# Patient Record
Sex: MALE | Race: WHITE | NOT HISPANIC OR LATINO | URBAN - METROPOLITAN AREA
[De-identification: names, ages, dates, MRNs, and addresses within clinical notes are randomized per-mention and may not be internally consistent; named-entity substitution may affect disease eponyms.]

---

## 2017-08-08 ENCOUNTER — INPATIENT (INPATIENT)
Facility: HOSPITAL | Age: 52
LOS: 1 days | Discharge: HOME | DRG: 176 | End: 2017-08-10
Attending: STUDENT IN AN ORGANIZED HEALTH CARE EDUCATION/TRAINING PROGRAM | Admitting: INTERNAL MEDICINE
Payer: COMMERCIAL

## 2017-08-08 VITALS
SYSTOLIC BLOOD PRESSURE: 125 MMHG | HEIGHT: 71 IN | WEIGHT: 179.9 LBS | RESPIRATION RATE: 16 BRPM | DIASTOLIC BLOOD PRESSURE: 85 MMHG | OXYGEN SATURATION: 100 % | TEMPERATURE: 98 F | HEART RATE: 52 BPM

## 2017-08-08 DIAGNOSIS — I26.99 OTHER PULMONARY EMBOLISM WITHOUT ACUTE COR PULMONALE: ICD-10-CM

## 2017-08-08 DIAGNOSIS — Z98.890 OTHER SPECIFIED POSTPROCEDURAL STATES: Chronic | ICD-10-CM

## 2017-08-08 DIAGNOSIS — Z90.49 ACQUIRED ABSENCE OF OTHER SPECIFIED PARTS OF DIGESTIVE TRACT: Chronic | ICD-10-CM

## 2017-08-08 DIAGNOSIS — I20.0 UNSTABLE ANGINA: ICD-10-CM

## 2017-08-08 LAB
ALBUMIN SERPL ELPH-MCNC: 3.8 G/DL — SIGNIFICANT CHANGE UP (ref 3.3–5)
ALBUMIN SERPL ELPH-MCNC: 4.4 G/DL — SIGNIFICANT CHANGE UP (ref 3.3–5)
ALP SERPL-CCNC: 74 U/L — SIGNIFICANT CHANGE UP (ref 40–120)
ALP SERPL-CCNC: 81 U/L — SIGNIFICANT CHANGE UP (ref 40–120)
ALT FLD-CCNC: 12 U/L RC — SIGNIFICANT CHANGE UP (ref 10–45)
ALT FLD-CCNC: 15 U/L RC — SIGNIFICANT CHANGE UP (ref 10–45)
ANION GAP SERPL CALC-SCNC: 11 MMOL/L — SIGNIFICANT CHANGE UP (ref 5–17)
ANION GAP SERPL CALC-SCNC: 14 MMOL/L — SIGNIFICANT CHANGE UP (ref 5–17)
APTT BLD: 35.2 SEC — SIGNIFICANT CHANGE UP (ref 27.5–37.4)
APTT BLD: 35.8 SEC — SIGNIFICANT CHANGE UP (ref 27.5–37.4)
AST SERPL-CCNC: 17 U/L — SIGNIFICANT CHANGE UP (ref 10–40)
AST SERPL-CCNC: 21 U/L — SIGNIFICANT CHANGE UP (ref 10–40)
BASOPHILS # BLD AUTO: 0 K/UL — SIGNIFICANT CHANGE UP (ref 0–0.2)
BASOPHILS NFR BLD AUTO: 0.3 % — SIGNIFICANT CHANGE UP (ref 0–2)
BILIRUB SERPL-MCNC: 0.4 MG/DL — SIGNIFICANT CHANGE UP (ref 0.2–1.2)
BILIRUB SERPL-MCNC: 0.6 MG/DL — SIGNIFICANT CHANGE UP (ref 0.2–1.2)
BLD GP AB SCN SERPL QL: NEGATIVE — SIGNIFICANT CHANGE UP
BUN SERPL-MCNC: 14 MG/DL — SIGNIFICANT CHANGE UP (ref 7–23)
BUN SERPL-MCNC: 15 MG/DL — SIGNIFICANT CHANGE UP (ref 7–23)
CALCIUM SERPL-MCNC: 9.1 MG/DL — SIGNIFICANT CHANGE UP (ref 8.4–10.5)
CALCIUM SERPL-MCNC: 9.5 MG/DL — SIGNIFICANT CHANGE UP (ref 8.4–10.5)
CHLORIDE SERPL-SCNC: 102 MMOL/L — SIGNIFICANT CHANGE UP (ref 96–108)
CHLORIDE SERPL-SCNC: 104 MMOL/L — SIGNIFICANT CHANGE UP (ref 96–108)
CK MB BLD-MCNC: 1.3 % — SIGNIFICANT CHANGE UP (ref 0–3.5)
CK MB CFR SERPL CALC: 1.2 NG/ML — SIGNIFICANT CHANGE UP (ref 0–6.7)
CK SERPL-CCNC: 90 U/L — SIGNIFICANT CHANGE UP (ref 30–200)
CO2 SERPL-SCNC: 25 MMOL/L — SIGNIFICANT CHANGE UP (ref 22–31)
CO2 SERPL-SCNC: 29 MMOL/L — SIGNIFICANT CHANGE UP (ref 22–31)
CREAT SERPL-MCNC: 0.86 MG/DL — SIGNIFICANT CHANGE UP (ref 0.5–1.3)
CREAT SERPL-MCNC: 0.91 MG/DL — SIGNIFICANT CHANGE UP (ref 0.5–1.3)
D DIMER BLD IA.RAPID-MCNC: 1189 NG/ML DDU — HIGH
EOSINOPHIL # BLD AUTO: 0.5 K/UL — SIGNIFICANT CHANGE UP (ref 0–0.5)
EOSINOPHIL NFR BLD AUTO: 5.6 % — SIGNIFICANT CHANGE UP (ref 0–6)
GLUCOSE SERPL-MCNC: 110 MG/DL — HIGH (ref 70–99)
GLUCOSE SERPL-MCNC: 89 MG/DL — SIGNIFICANT CHANGE UP (ref 70–99)
HCT VFR BLD CALC: 41.9 % — SIGNIFICANT CHANGE UP (ref 39–50)
HCT VFR BLD CALC: 46.3 % — SIGNIFICANT CHANGE UP (ref 39–50)
HGB BLD-MCNC: 14.2 G/DL — SIGNIFICANT CHANGE UP (ref 13–17)
HGB BLD-MCNC: 15.1 G/DL — SIGNIFICANT CHANGE UP (ref 13–17)
INR BLD: 1.14 RATIO — SIGNIFICANT CHANGE UP (ref 0.88–1.16)
INR BLD: 1.15 RATIO — SIGNIFICANT CHANGE UP (ref 0.88–1.16)
LYMPHOCYTES # BLD AUTO: 2 K/UL — SIGNIFICANT CHANGE UP (ref 1–3.3)
LYMPHOCYTES # BLD AUTO: 23.6 % — SIGNIFICANT CHANGE UP (ref 13–44)
MAGNESIUM SERPL-MCNC: 1.9 MG/DL — SIGNIFICANT CHANGE UP (ref 1.6–2.6)
MCHC RBC-ENTMCNC: 29.4 PG — SIGNIFICANT CHANGE UP (ref 27–34)
MCHC RBC-ENTMCNC: 30.2 PG — SIGNIFICANT CHANGE UP (ref 27–34)
MCHC RBC-ENTMCNC: 32.6 GM/DL — SIGNIFICANT CHANGE UP (ref 32–36)
MCHC RBC-ENTMCNC: 33.9 GM/DL — SIGNIFICANT CHANGE UP (ref 32–36)
MCV RBC AUTO: 89.3 FL — SIGNIFICANT CHANGE UP (ref 80–100)
MCV RBC AUTO: 90.2 FL — SIGNIFICANT CHANGE UP (ref 80–100)
MONOCYTES # BLD AUTO: 0.6 K/UL — SIGNIFICANT CHANGE UP (ref 0–0.9)
MONOCYTES NFR BLD AUTO: 6.8 % — SIGNIFICANT CHANGE UP (ref 2–14)
NEUTROPHILS # BLD AUTO: 5.5 K/UL — SIGNIFICANT CHANGE UP (ref 1.8–7.4)
NEUTROPHILS NFR BLD AUTO: 63.8 % — SIGNIFICANT CHANGE UP (ref 43–77)
PHOSPHATE SERPL-MCNC: 3 MG/DL — SIGNIFICANT CHANGE UP (ref 2.5–4.5)
PLATELET # BLD AUTO: 185 K/UL — SIGNIFICANT CHANGE UP (ref 150–400)
PLATELET # BLD AUTO: 226 K/UL — SIGNIFICANT CHANGE UP (ref 150–400)
POTASSIUM SERPL-MCNC: 3.8 MMOL/L — SIGNIFICANT CHANGE UP (ref 3.5–5.3)
POTASSIUM SERPL-MCNC: 4.6 MMOL/L — SIGNIFICANT CHANGE UP (ref 3.5–5.3)
POTASSIUM SERPL-SCNC: 3.8 MMOL/L — SIGNIFICANT CHANGE UP (ref 3.5–5.3)
POTASSIUM SERPL-SCNC: 4.6 MMOL/L — SIGNIFICANT CHANGE UP (ref 3.5–5.3)
PROT SERPL-MCNC: 6.1 G/DL — SIGNIFICANT CHANGE UP (ref 6–8.3)
PROT SERPL-MCNC: 7 G/DL — SIGNIFICANT CHANGE UP (ref 6–8.3)
PROTHROM AB SERPL-ACNC: 12.5 SEC — SIGNIFICANT CHANGE UP (ref 9.8–12.7)
PROTHROM AB SERPL-ACNC: 12.6 SEC — SIGNIFICANT CHANGE UP (ref 9.8–12.7)
RBC # BLD: 4.69 M/UL — SIGNIFICANT CHANGE UP (ref 4.2–5.8)
RBC # BLD: 5.13 M/UL — SIGNIFICANT CHANGE UP (ref 4.2–5.8)
RBC # FLD: 11.9 % — SIGNIFICANT CHANGE UP (ref 10.3–14.5)
RBC # FLD: 12.2 % — SIGNIFICANT CHANGE UP (ref 10.3–14.5)
RH IG SCN BLD-IMP: POSITIVE — SIGNIFICANT CHANGE UP
SODIUM SERPL-SCNC: 141 MMOL/L — SIGNIFICANT CHANGE UP (ref 135–145)
SODIUM SERPL-SCNC: 144 MMOL/L — SIGNIFICANT CHANGE UP (ref 135–145)
TROPONIN T SERPL-MCNC: <0.01 NG/ML — SIGNIFICANT CHANGE UP (ref 0–0.06)
WBC # BLD: 8 K/UL — SIGNIFICANT CHANGE UP (ref 3.8–10.5)
WBC # BLD: 8.7 K/UL — SIGNIFICANT CHANGE UP (ref 3.8–10.5)
WBC # FLD AUTO: 8 K/UL — SIGNIFICANT CHANGE UP (ref 3.8–10.5)
WBC # FLD AUTO: 8.7 K/UL — SIGNIFICANT CHANGE UP (ref 3.8–10.5)

## 2017-08-08 PROCEDURE — 71275 CT ANGIOGRAPHY CHEST: CPT | Mod: 26

## 2017-08-08 PROCEDURE — 93010 ELECTROCARDIOGRAM REPORT: CPT

## 2017-08-08 PROCEDURE — 93970 EXTREMITY STUDY: CPT | Mod: 26

## 2017-08-08 RX ORDER — HEPARIN SODIUM 5000 [USP'U]/ML
3000 INJECTION INTRAVENOUS; SUBCUTANEOUS EVERY 6 HOURS
Qty: 0 | Refills: 0 | Status: DISCONTINUED | OUTPATIENT
Start: 2017-08-08 | End: 2017-08-10

## 2017-08-08 RX ORDER — HEPARIN SODIUM 5000 [USP'U]/ML
6500 INJECTION INTRAVENOUS; SUBCUTANEOUS ONCE
Qty: 0 | Refills: 0 | Status: DISCONTINUED | OUTPATIENT
Start: 2017-08-08 | End: 2017-08-08

## 2017-08-08 RX ORDER — HEPARIN SODIUM 5000 [USP'U]/ML
6500 INJECTION INTRAVENOUS; SUBCUTANEOUS EVERY 6 HOURS
Qty: 0 | Refills: 0 | Status: DISCONTINUED | OUTPATIENT
Start: 2017-08-08 | End: 2017-08-10

## 2017-08-08 RX ORDER — POTASSIUM CHLORIDE 20 MEQ
40 PACKET (EA) ORAL ONCE
Qty: 0 | Refills: 0 | Status: DISCONTINUED | OUTPATIENT
Start: 2017-08-08 | End: 2017-08-09

## 2017-08-08 RX ORDER — MAGNESIUM SULFATE 500 MG/ML
1 VIAL (ML) INJECTION ONCE
Qty: 0 | Refills: 0 | Status: DISCONTINUED | OUTPATIENT
Start: 2017-08-08 | End: 2017-08-09

## 2017-08-08 RX ORDER — ACETAMINOPHEN 500 MG
650 TABLET ORAL ONCE
Qty: 0 | Refills: 0 | Status: DISCONTINUED | OUTPATIENT
Start: 2017-08-08 | End: 2017-08-08

## 2017-08-08 RX ORDER — HEPARIN SODIUM 5000 [USP'U]/ML
INJECTION INTRAVENOUS; SUBCUTANEOUS
Qty: 25000 | Refills: 0 | Status: DISCONTINUED | OUTPATIENT
Start: 2017-08-08 | End: 2017-08-10

## 2017-08-08 RX ORDER — METOPROLOL TARTRATE 50 MG
25 TABLET ORAL DAILY
Qty: 0 | Refills: 0 | Status: DISCONTINUED | OUTPATIENT
Start: 2017-08-08 | End: 2017-08-08

## 2017-08-08 RX ADMIN — HEPARIN SODIUM 1500 UNIT(S)/HR: 5000 INJECTION INTRAVENOUS; SUBCUTANEOUS at 21:56

## 2017-08-08 NOTE — H&P CARDIOLOGY - HISTORY OF PRESENT ILLNESS
51 y/o male PMH cardiac arrhythmia (?Afib) s/p ablation (9/2016) presents c/o exertional chest pressure and dyspnea with walking or climbing <1 flight of stairs over the past 1-2 weeks. Symptoms improve with rest. Pt reports he normally runs and plays sports regularly. Evaluated by PCP, Dr. Wakefield, and referred for cardiac catheterization today. Pt started Toprol yesterday. 53 y/o male PMH cardiac arrhythmia (?Afib) s/p ablation (9/2016) presents c/o exertional chest pressure and dyspnea with walking or climbing <1 flight of stairs over the past 1-2 weeks. Symptoms improve with rest. Pt reports he normally runs and plays sports regularly. Evaluated by PCP, Dr. Wakefield, and referred for cardiac catheterization today. Pt states he had echo yesterday (no results in chart).   Pt started Toprol yesterday. 51 y/o male PMH cardiac arrhythmia (?Afib) s/p ablation (9/2016) presents c/o exertional chest pressure and dyspnea with walking or climbing <1 flight of stairs over the past 1-2 weeks. Symptoms improve with rest. Pt reports he normally runs and plays sports regularly. Evaluated by PCP, Dr. Wakefield, and referred for cardiac catheterization today. Pt states he had echo yesterday (no results in chart).   Pt started Toprol yesterday.   He reports recent travel to [place].

## 2017-08-08 NOTE — CHART NOTE - NSCHARTNOTEFT_GEN_A_CORE
TRANSFER from cath recovery     ACCEPTING PHSYICIAN: Dr. Amaya    CHIEF COMPLAINT: ROSAS and chest pressure on exertion     SUBJECTIVE: @ rest no CP/sob/palpitations. No bruising/bleeding. No melena/hematemesis/hematochezia/coffee ground emesis. No s/s bleeding. No lightheadedness/dizziness/syncope/near syncope. No hx malignancy, no family hx blood clots, no trauma or injury. Recent 7+ hour flight to and from [facility] (back 7/8). Notes he did have L foot pain and swelling while in [place] x 1 day. Has chronic OA joint pain.     All other ROS negative except as stated above     OBJECTIVE:  Vital Signs Last 24 Hrs  T(C): 36.7 (08 Aug 2017 21:30), Max: 36.7 (08 Aug 2017 21:30)  T(F): 98 (08 Aug 2017 21:30), Max: 98 (08 Aug 2017 21:30)  HR: 54 (08 Aug 2017 21:30) (52 - 54)  BP: 127/81 (08 Aug 2017 21:30) (125/85 - 127/81)  BP(mean): 96 (08 Aug 2017 21:30) (96 - 98)  RR: 17 (08 Aug 2017 21:30) (16 - 17)  SpO2: 99% (08 Aug 2017 21:30) (99% - 100%)    PHYSICAL EXAM:   General: NAD, well nourished  HEENT: oral mucosa moist. head atraumatic, normocephalic.   Cardiovascular: S1, S2. bradycardic. no JVD. no LE edema. No calf tenderness/swelling - calves equal b/l. Pulses 2+ UE, LE bilaterally   Respiratory: CTA bilaterally, good rate and effort   Gastrointestinal: soft, non tender, non distended, normoactive BS all 4 quadrants   Genitourinary: no CV angle tenderness   Integumentary: RFA access site w/ out bruising, bleeding or hematoma note. Groin soft. No focal lesions or breakdown   Musculoskeletal: moving all 4 extremities - strength symmetric bilaterally   Hematologic/Lymphatic: ? enlarged R inguinal lymph node   Neurologic: A, A, O x 4. PERRL  Psychologic: mood and affect appropriate.     TELEMETRY:sinus elizabeth     EKG:     CARDIAC CATH: diagnostic LHC, RHC     ECHO:     < from: [facility] Duplex Lower Ext Vein Scan, Bilat (08.08.17 @ 19:43) >    Above and below-the-knee deep venous thrombosis in the left lower   extremity.      CTA chest prelim - extensive bilateral pulmonary embolii.  bowing of the interventricular septum may be concercing for right heart strain, which may be confirmed with echocardiogram.                        15.1   8.0   )-----------( 226      ( 08 Aug 2017 12:11 )             46.3     08-08    144  |  104  |  14  ----------------------------<  89  4.6   |  29  |  0.91    Ca    9.5      08 Aug 2017 12:11    TPro  7.0  /  Alb  4.4  /  TBili  0.6  /  DBili  x   /  AST  21  /  ALT  15  /  AlkPhos  81  08-08    LIVER FUNCTIONS - ( 08 Aug 2017 12:11 )  Alb: 4.4 g/dL / Pro: 7.0 g/dL / ALK PHOS: 81 U/L / ALT: 15 U/L RC / AST: 21 U/L / GGT: x           PT/INR - ( 08 Aug 2017 20:37 )   PT: 12.5 sec;   INR: 1.14 ratio      PTT - ( 08 Aug 2017 20:37 )  PTT:35.2 sec    ASSESSMENT/PLAN:     53 yo active M A fib (ablation 9/2016 Dr Perez), OA, s/p flight to and from [place] 1 mo ago p/w 1-2 weeks of progressively worsening and activity limiting ROSAS and exertional chest pressure. Admitted for LHC/RHC 8/8 w/ clean coronaries. Found to have above and below the knee LLE DVT and b/l PEs w/ evidence on CTA for RH strain    1. LLE DVT and b/l PEs - likely provoked in setting of transatlantic flight - hemodynamically stable @ this time, no s/s active bleeding, start heparin gtt. obtain TTE and CE to eval for RH strain. Monitor in ICU for s/s acute decompensation. Will need to ambulate patient to assess HR and oxygenation      2. pA fib- currently in NSR, not on AC @ this time s/p a fib ablation 9/2016 (previously on eliquis). Hold toprol for now (first dose taken yesterday)     Susannah Beck CCU NP   #[id number] TRANSFER from cath recovery     ACCEPTING PHSYICIAN: Dr. Amaya    CHIEF COMPLAINT: ROSAS and chest pressure on exertion     SUBJECTIVE: @ rest no CP/sob/palpitations. No bruising/bleeding. No melena/hematemesis/hematochezia/coffee ground emesis. No s/s bleeding. No lightheadedness/dizziness/syncope/near syncope. No hx malignancy, no family hx blood clots, no trauma or injury. Recent 7+ hour flight to and from [facility] (back 7/8). Notes he did have L foot pain and swelling while in [place] x 1 day. Has chronic OA joint pain.     All other ROS negative except as stated above     OBJECTIVE:  Vital Signs Last 24 Hrs  T(C): 36.7 (08 Aug 2017 21:30), Max: 36.7 (08 Aug 2017 21:30)  T(F): 98 (08 Aug 2017 21:30), Max: 98 (08 Aug 2017 21:30)  HR: 54 (08 Aug 2017 21:30) (52 - 54)  BP: 127/81 (08 Aug 2017 21:30) (125/85 - 127/81)  BP(mean): 96 (08 Aug 2017 21:30) (96 - 98)  RR: 17 (08 Aug 2017 21:30) (16 - 17)  SpO2: 99% (08 Aug 2017 21:30) (99% - 100%)    PHYSICAL EXAM:   General: NAD, well nourished  HEENT: oral mucosa moist. head atraumatic, normocephalic.   Cardiovascular: S1, S2. bradycardic. no JVD. no LE edema. No calf tenderness/swelling - calves equal b/l. Pulses 2+ UE, LE bilaterally   Respiratory: CTA bilaterally, good rate and effort   Gastrointestinal: soft, non tender, non distended, normoactive BS all 4 quadrants   Genitourinary: no CV angle tenderness   Integumentary: RFA access site w/ out bruising, bleeding or hematoma note. Groin soft. No focal lesions or breakdown   Musculoskeletal: moving all 4 extremities - strength symmetric bilaterally   Hematologic/Lymphatic: ? enlarged R inguinal lymph node   Neurologic: A, A, O x 4. PERRL  Psychologic: mood and affect appropriate.     TELEMETRY:sinus elizabeth     EKG:     CARDIAC CATH:   < from: Cardiac Cath Lab - Adult (08.08.17 @ 13:22) >  EF 55%   The coronary circulation is right dominant.  LM:   --  LM: Normal.  LAD:   --  LAD: Normal.  CX:   --  Circumflex: Normal.  RCA:   --  RCA: Normal.    < from: Cardiac Cath Lab - Adult (08.08.17 @ 13:22) >  Pressures:  -- Pulmonary Artery (S/D/M): 34/9/20  Pressures:  -- Pulmonary Capillary Wedge: 15/21/14  Pressures:  -- Right Atrium (a/v/M): 12/8/6  Outputs:  -- OUTPUTS: CO by Roz: 7.08  Outputs:  -- OUTPUTS: Roz cardiac index: 3.51    ECHO:     < from: [facility] Duplex Lower Ext Vein Scan, Bilat (08.08.17 @ 19:43) >    Above and below-the-knee deep venous thrombosis in the left lower   extremity.      CTA chest prelim - extensive bilateral pulmonary embolii.  bowing of the interventricular septum may be concercing for right heart strain, which may be confirmed with echocardiogram.                        15.1   8.0   )-----------( 226      ( 08 Aug 2017 12:11 )             46.3     08-08    144  |  104  |  14  ----------------------------<  89  4.6   |  29  |  0.91    Ca    9.5      08 Aug 2017 12:11    TPro  7.0  /  Alb  4.4  /  TBili  0.6  /  DBili  x   /  AST  21  /  ALT  15  /  AlkPhos  81  08-08    LIVER FUNCTIONS - ( 08 Aug 2017 12:11 )  Alb: 4.4 g/dL / Pro: 7.0 g/dL / ALK PHOS: 81 U/L / ALT: 15 U/L RC / AST: 21 U/L / GGT: x           PT/INR - ( 08 Aug 2017 20:37 )   PT: 12.5 sec;   INR: 1.14 ratio      PTT - ( 08 Aug 2017 20:37 )  PTT:35.2 sec    ASSESSMENT/PLAN:     51 yo active M CHRYSTAL delvalle (ablation 9/2016 Dr Perez), OA, s/p flight to and from [place] 1 mo ago p/w 1-2 weeks of progressively worsening and activity limiting ROSAS and exertional chest pressure. Admitted for LHC/RHC 8/8 w/ clean coronaries. Found to have above and below the knee LLE DVT and b/l PEs w/ evidence on CTA for RH strain    1. LLE DVT and b/l PEs - likely provoked in setting of transatlantic flight - hemodynamically stable @ this time, no s/s active bleeding, start heparin gtt. obtain TTE and CE to eval for RH strain. Monitor in ICU for s/s acute decompensation. Will need to ambulate patient to assess HR and oxygenation      2. pA fib- currently in NSR, not on AC @ this time s/p a fib ablation 9/2016 (previously on eliquis). Hold toprol for now (first dose taken yesterday)     Susannah Beck CCU NP   #[id number] TRANSFER from cath recovery     ACCEPTING PHSYICIAN: Dr. Amaya    CHIEF COMPLAINT: ROSAS and chest pressure on exertion     SUBJECTIVE: @ rest no CP/sob/palpitations. No bruising/bleeding. No melena/hematemesis/hematochezia/coffee ground emesis. No s/s bleeding. No lightheadedness/dizziness/syncope/near syncope. No hx malignancy, no family hx blood clots, no trauma or injury. Recent 7+ hour flight to and from [facility] (back 7/8). Notes he did have L foot pain and swelling while in [place] x 1 day. Has chronic OA joint pain.     All other ROS negative except as stated above     OBJECTIVE:  Vital Signs Last 24 Hrs  T(C): 36.7 (08 Aug 2017 21:30), Max: 36.7 (08 Aug 2017 21:30)  T(F): 98 (08 Aug 2017 21:30), Max: 98 (08 Aug 2017 21:30)  HR: 54 (08 Aug 2017 21:30) (52 - 54)  BP: 127/81 (08 Aug 2017 21:30) (125/85 - 127/81)  BP(mean): 96 (08 Aug 2017 21:30) (96 - 98)  RR: 17 (08 Aug 2017 21:30) (16 - 17)  SpO2: 99% (08 Aug 2017 21:30) (99% - 100%)    PHYSICAL EXAM:   General: NAD, well nourished  HEENT: oral mucosa moist. head atraumatic, normocephalic.   Cardiovascular: S1, S2. bradycardic. no JVD. no LE edema. No calf tenderness/swelling - calves equal b/l. Pulses 2+ UE, LE bilaterally   Respiratory: CTA bilaterally, good rate and effort   Gastrointestinal: soft, non tender, non distended, normoactive BS all 4 quadrants   Genitourinary: no CV angle tenderness   Integumentary: RFA access site w/ out bruising, bleeding or hematoma note. Groin soft. No focal lesions or breakdown   Musculoskeletal: moving all 4 extremities - strength symmetric bilaterally   Hematologic/Lymphatic: ? enlarged R inguinal lymph node   Neurologic: A, A, O x 4. PERRL  Psychologic: mood and affect appropriate.     TELEMETRY:sinus elizabeth     EKG:     CARDIAC CATH:   < from: Cardiac Cath Lab - Adult (08.08.17 @ 13:22) >  EF 55%   The coronary circulation is right dominant.  LM:   --  LM: Normal.  LAD:   --  LAD: Normal.  CX:   --  Circumflex: Normal.  RCA:   --  RCA: Normal.    < from: Cardiac Cath Lab - Adult (08.08.17 @ 13:22) >  Pressures:  -- Pulmonary Artery (S/D/M): 34/9/20  Pressures:  -- Pulmonary Capillary Wedge: 15/21/14  Pressures:  -- Right Atrium (a/v/M): 12/8/6  Outputs:  -- OUTPUTS: CO by Roz: 7.08  Outputs:  -- OUTPUTS: Roz cardiac index: 3.51    ECHO:     < from: [facility] Duplex Lower Ext Vein Scan, Bilat (08.08.17 @ 19:43) >    Above and below-the-knee deep venous thrombosis in the left lower   extremity.      CTA chest prelim - extensive bilateral pulmonary embolii.  bowing of the interventricular septum may be concercing for right heart strain, which may be confirmed with echocardiogram.                        15.1   8.0   )-----------( 226      ( 08 Aug 2017 12:11 )             46.3     08-08    144  |  104  |  14  ----------------------------<  89  4.6   |  29  |  0.91    Ca    9.5      08 Aug 2017 12:11    TPro  7.0  /  Alb  4.4  /  TBili  0.6  /  DBili  x   /  AST  21  /  ALT  15  /  AlkPhos  81  08-08    LIVER FUNCTIONS - ( 08 Aug 2017 12:11 )  Alb: 4.4 g/dL / Pro: 7.0 g/dL / ALK PHOS: 81 U/L / ALT: 15 U/L RC / AST: 21 U/L / GGT: x           PT/INR - ( 08 Aug 2017 20:37 )   PT: 12.5 sec;   INR: 1.14 ratio      PTT - ( 08 Aug 2017 20:37 )  PTT:35.2 sec    ASSESSMENT/PLAN:     53 yo active M CHRYSTAL delvalle (ablation 9/2016 Dr Perez), OA, s/p flight to and from [place] 1 mo ago p/w 1-2 weeks of progressively worsening and activity limiting ROSAS and exertional chest pressure. Admitted for LHC/RHC 8/8 w/ clean coronaries. Found to have above and below the knee LLE DVT and b/l PEs w/ evidence on CTA for RH strain.     1. LLE DVT and b/l PEs - likely provoked in setting of transatlantic flight - hemodynamically stable @ this time, no s/s active bleeding, start heparin gtt. obtain TTE and CE to eval for RH strain. Monitor in ICU for s/s acute decompensation. Will need to ambulate patient to assess HR and oxygenation. Monitor Cr s/p cath and CT coronaries     2. pA fib- currently in NSR, not on AC @ this time s/p a fib ablation 9/2016 (previously on eliquis). Hold toprol for now (first dose taken yesterday)     Susannah Beck CCU NP   #[id number]

## 2017-08-09 ENCOUNTER — TRANSCRIPTION ENCOUNTER (OUTPATIENT)
Age: 52
End: 2017-08-09

## 2017-08-09 DIAGNOSIS — I26.99 OTHER PULMONARY EMBOLISM WITHOUT ACUTE COR PULMONALE: ICD-10-CM

## 2017-08-09 LAB
ALBUMIN SERPL ELPH-MCNC: 3.9 G/DL — SIGNIFICANT CHANGE UP (ref 3.3–5)
ALBUMIN SERPL ELPH-MCNC: 4.1 G/DL — SIGNIFICANT CHANGE UP (ref 3.3–5)
ALBUMIN SERPL ELPH-MCNC: 4.1 G/DL — SIGNIFICANT CHANGE UP (ref 3.3–5)
ALP SERPL-CCNC: 78 U/L — SIGNIFICANT CHANGE UP (ref 40–120)
ALP SERPL-CCNC: 79 U/L — SIGNIFICANT CHANGE UP (ref 40–120)
ALP SERPL-CCNC: 84 U/L — SIGNIFICANT CHANGE UP (ref 40–120)
ALT FLD-CCNC: 13 U/L RC — SIGNIFICANT CHANGE UP (ref 10–45)
ALT FLD-CCNC: 14 U/L RC — SIGNIFICANT CHANGE UP (ref 10–45)
ALT FLD-CCNC: 14 U/L RC — SIGNIFICANT CHANGE UP (ref 10–45)
ANION GAP SERPL CALC-SCNC: 12 MMOL/L — SIGNIFICANT CHANGE UP (ref 5–17)
ANION GAP SERPL CALC-SCNC: 13 MMOL/L — SIGNIFICANT CHANGE UP (ref 5–17)
ANION GAP SERPL CALC-SCNC: 15 MMOL/L — SIGNIFICANT CHANGE UP (ref 5–17)
APTT BLD: 116.3 SEC — HIGH (ref 27.5–37.4)
APTT BLD: 89.8 SEC — HIGH (ref 27.5–37.4)
APTT BLD: 92 SEC — HIGH (ref 27.5–37.4)
AST SERPL-CCNC: 17 U/L — SIGNIFICANT CHANGE UP (ref 10–40)
AST SERPL-CCNC: 18 U/L — SIGNIFICANT CHANGE UP (ref 10–40)
AST SERPL-CCNC: 26 U/L — SIGNIFICANT CHANGE UP (ref 10–40)
BASOPHILS # BLD AUTO: 0 K/UL — SIGNIFICANT CHANGE UP (ref 0–0.2)
BASOPHILS # BLD AUTO: 0.1 K/UL — SIGNIFICANT CHANGE UP (ref 0–0.2)
BASOPHILS NFR BLD AUTO: 0.4 % — SIGNIFICANT CHANGE UP (ref 0–2)
BASOPHILS NFR BLD AUTO: 0.7 % — SIGNIFICANT CHANGE UP (ref 0–2)
BILIRUB SERPL-MCNC: 0.3 MG/DL — SIGNIFICANT CHANGE UP (ref 0.2–1.2)
BILIRUB SERPL-MCNC: 0.4 MG/DL — SIGNIFICANT CHANGE UP (ref 0.2–1.2)
BILIRUB SERPL-MCNC: 0.5 MG/DL — SIGNIFICANT CHANGE UP (ref 0.2–1.2)
BUN SERPL-MCNC: 11 MG/DL — SIGNIFICANT CHANGE UP (ref 7–23)
BUN SERPL-MCNC: 13 MG/DL — SIGNIFICANT CHANGE UP (ref 7–23)
BUN SERPL-MCNC: 13 MG/DL — SIGNIFICANT CHANGE UP (ref 7–23)
CALCIUM SERPL-MCNC: 9.1 MG/DL — SIGNIFICANT CHANGE UP (ref 8.4–10.5)
CALCIUM SERPL-MCNC: 9.3 MG/DL — SIGNIFICANT CHANGE UP (ref 8.4–10.5)
CALCIUM SERPL-MCNC: 9.5 MG/DL — SIGNIFICANT CHANGE UP (ref 8.4–10.5)
CHLORIDE SERPL-SCNC: 100 MMOL/L — SIGNIFICANT CHANGE UP (ref 96–108)
CHLORIDE SERPL-SCNC: 101 MMOL/L — SIGNIFICANT CHANGE UP (ref 96–108)
CHLORIDE SERPL-SCNC: 102 MMOL/L — SIGNIFICANT CHANGE UP (ref 96–108)
CHOLEST SERPL-MCNC: 176 MG/DL — SIGNIFICANT CHANGE UP (ref 10–199)
CK MB BLD-MCNC: 1.3 % — SIGNIFICANT CHANGE UP (ref 0–3.5)
CK MB BLD-MCNC: 1.3 % — SIGNIFICANT CHANGE UP (ref 0–3.5)
CK MB BLD-MCNC: 1.5 % — SIGNIFICANT CHANGE UP (ref 0–3.5)
CK MB CFR SERPL CALC: 1.2 NG/ML — SIGNIFICANT CHANGE UP (ref 0–6.7)
CK MB CFR SERPL CALC: 1.3 NG/ML — SIGNIFICANT CHANGE UP (ref 0–6.7)
CK MB CFR SERPL CALC: 1.3 NG/ML — SIGNIFICANT CHANGE UP (ref 0–6.7)
CK SERPL-CCNC: 102 U/L — SIGNIFICANT CHANGE UP (ref 30–200)
CK SERPL-CCNC: 89 U/L — SIGNIFICANT CHANGE UP (ref 30–200)
CK SERPL-CCNC: 89 U/L — SIGNIFICANT CHANGE UP (ref 30–200)
CO2 SERPL-SCNC: 24 MMOL/L — SIGNIFICANT CHANGE UP (ref 22–31)
CO2 SERPL-SCNC: 26 MMOL/L — SIGNIFICANT CHANGE UP (ref 22–31)
CO2 SERPL-SCNC: 27 MMOL/L — SIGNIFICANT CHANGE UP (ref 22–31)
CREAT SERPL-MCNC: 0.84 MG/DL — SIGNIFICANT CHANGE UP (ref 0.5–1.3)
CREAT SERPL-MCNC: 0.9 MG/DL — SIGNIFICANT CHANGE UP (ref 0.5–1.3)
CREAT SERPL-MCNC: 0.96 MG/DL — SIGNIFICANT CHANGE UP (ref 0.5–1.3)
EOSINOPHIL # BLD AUTO: 0.5 K/UL — SIGNIFICANT CHANGE UP (ref 0–0.5)
EOSINOPHIL # BLD AUTO: 0.6 K/UL — HIGH (ref 0–0.5)
EOSINOPHIL NFR BLD AUTO: 5.7 % — SIGNIFICANT CHANGE UP (ref 0–6)
EOSINOPHIL NFR BLD AUTO: 6.4 % — HIGH (ref 0–6)
GLUCOSE SERPL-MCNC: 107 MG/DL — HIGH (ref 70–99)
GLUCOSE SERPL-MCNC: 167 MG/DL — HIGH (ref 70–99)
GLUCOSE SERPL-MCNC: 96 MG/DL — SIGNIFICANT CHANGE UP (ref 70–99)
HBA1C BLD-MCNC: 5.2 % — SIGNIFICANT CHANGE UP (ref 4–5.6)
HCT VFR BLD CALC: 43.7 % — SIGNIFICANT CHANGE UP (ref 39–50)
HCT VFR BLD CALC: 44.5 % — SIGNIFICANT CHANGE UP (ref 39–50)
HCT VFR BLD CALC: 47.1 % — SIGNIFICANT CHANGE UP (ref 39–50)
HDLC SERPL-MCNC: 35 MG/DL — LOW (ref 40–125)
HGB BLD-MCNC: 14.7 G/DL — SIGNIFICANT CHANGE UP (ref 13–17)
HGB BLD-MCNC: 14.8 G/DL — SIGNIFICANT CHANGE UP (ref 13–17)
HGB BLD-MCNC: 15.8 G/DL — SIGNIFICANT CHANGE UP (ref 13–17)
INR BLD: 1.11 RATIO — SIGNIFICANT CHANGE UP (ref 0.88–1.16)
INR BLD: 1.13 RATIO — SIGNIFICANT CHANGE UP (ref 0.88–1.16)
INR BLD: 1.16 RATIO — SIGNIFICANT CHANGE UP (ref 0.88–1.16)
LIPID PNL WITH DIRECT LDL SERPL: 100 MG/DL — SIGNIFICANT CHANGE UP
LYMPHOCYTES # BLD AUTO: 2.2 K/UL — SIGNIFICANT CHANGE UP (ref 1–3.3)
LYMPHOCYTES # BLD AUTO: 2.4 K/UL — SIGNIFICANT CHANGE UP (ref 1–3.3)
LYMPHOCYTES # BLD AUTO: 24.5 % — SIGNIFICANT CHANGE UP (ref 13–44)
LYMPHOCYTES # BLD AUTO: 26.6 % — SIGNIFICANT CHANGE UP (ref 13–44)
MAGNESIUM SERPL-MCNC: 1.8 MG/DL — SIGNIFICANT CHANGE UP (ref 1.6–2.6)
MAGNESIUM SERPL-MCNC: 2 MG/DL — SIGNIFICANT CHANGE UP (ref 1.6–2.6)
MAGNESIUM SERPL-MCNC: 2.2 MG/DL — SIGNIFICANT CHANGE UP (ref 1.6–2.6)
MCHC RBC-ENTMCNC: 29.5 PG — SIGNIFICANT CHANGE UP (ref 27–34)
MCHC RBC-ENTMCNC: 29.8 PG — SIGNIFICANT CHANGE UP (ref 27–34)
MCHC RBC-ENTMCNC: 30.2 PG — SIGNIFICANT CHANGE UP (ref 27–34)
MCHC RBC-ENTMCNC: 33.1 GM/DL — SIGNIFICANT CHANGE UP (ref 32–36)
MCHC RBC-ENTMCNC: 33.5 GM/DL — SIGNIFICANT CHANGE UP (ref 32–36)
MCHC RBC-ENTMCNC: 33.8 GM/DL — SIGNIFICANT CHANGE UP (ref 32–36)
MCV RBC AUTO: 89 FL — SIGNIFICANT CHANGE UP (ref 80–100)
MCV RBC AUTO: 89.2 FL — SIGNIFICANT CHANGE UP (ref 80–100)
MCV RBC AUTO: 89.2 FL — SIGNIFICANT CHANGE UP (ref 80–100)
MONOCYTES # BLD AUTO: 0.6 K/UL — SIGNIFICANT CHANGE UP (ref 0–0.9)
MONOCYTES # BLD AUTO: 0.8 K/UL — SIGNIFICANT CHANGE UP (ref 0–0.9)
MONOCYTES NFR BLD AUTO: 6.6 % — SIGNIFICANT CHANGE UP (ref 2–14)
MONOCYTES NFR BLD AUTO: 8.7 % — SIGNIFICANT CHANGE UP (ref 2–14)
NEUTROPHILS # BLD AUTO: 5.4 K/UL — SIGNIFICANT CHANGE UP (ref 1.8–7.4)
NEUTROPHILS # BLD AUTO: 5.5 K/UL — SIGNIFICANT CHANGE UP (ref 1.8–7.4)
NEUTROPHILS NFR BLD AUTO: 59.7 % — SIGNIFICANT CHANGE UP (ref 43–77)
NEUTROPHILS NFR BLD AUTO: 60.8 % — SIGNIFICANT CHANGE UP (ref 43–77)
PHOSPHATE SERPL-MCNC: 2.6 MG/DL — SIGNIFICANT CHANGE UP (ref 2.5–4.5)
PHOSPHATE SERPL-MCNC: 2.9 MG/DL — SIGNIFICANT CHANGE UP (ref 2.5–4.5)
PHOSPHATE SERPL-MCNC: 4 MG/DL — SIGNIFICANT CHANGE UP (ref 2.5–4.5)
PLATELET # BLD AUTO: 190 K/UL — SIGNIFICANT CHANGE UP (ref 150–400)
PLATELET # BLD AUTO: 202 K/UL — SIGNIFICANT CHANGE UP (ref 150–400)
PLATELET # BLD AUTO: 210 K/UL — SIGNIFICANT CHANGE UP (ref 150–400)
POTASSIUM SERPL-MCNC: 3.6 MMOL/L — SIGNIFICANT CHANGE UP (ref 3.5–5.3)
POTASSIUM SERPL-MCNC: 4 MMOL/L — SIGNIFICANT CHANGE UP (ref 3.5–5.3)
POTASSIUM SERPL-MCNC: 4.4 MMOL/L — SIGNIFICANT CHANGE UP (ref 3.5–5.3)
POTASSIUM SERPL-SCNC: 3.6 MMOL/L — SIGNIFICANT CHANGE UP (ref 3.5–5.3)
POTASSIUM SERPL-SCNC: 4 MMOL/L — SIGNIFICANT CHANGE UP (ref 3.5–5.3)
POTASSIUM SERPL-SCNC: 4.4 MMOL/L — SIGNIFICANT CHANGE UP (ref 3.5–5.3)
PROT SERPL-MCNC: 6.2 G/DL — SIGNIFICANT CHANGE UP (ref 6–8.3)
PROT SERPL-MCNC: 6.6 G/DL — SIGNIFICANT CHANGE UP (ref 6–8.3)
PROT SERPL-MCNC: 7 G/DL — SIGNIFICANT CHANGE UP (ref 6–8.3)
PROTHROM AB SERPL-ACNC: 12.1 SEC — SIGNIFICANT CHANGE UP (ref 9.8–12.7)
PROTHROM AB SERPL-ACNC: 12.3 SEC — SIGNIFICANT CHANGE UP (ref 9.8–12.7)
PROTHROM AB SERPL-ACNC: 12.7 SEC — SIGNIFICANT CHANGE UP (ref 9.8–12.7)
RBC # BLD: 4.9 M/UL — SIGNIFICANT CHANGE UP (ref 4.2–5.8)
RBC # BLD: 4.99 M/UL — SIGNIFICANT CHANGE UP (ref 4.2–5.8)
RBC # BLD: 5.29 M/UL — SIGNIFICANT CHANGE UP (ref 4.2–5.8)
RBC # FLD: 12 % — SIGNIFICANT CHANGE UP (ref 10.3–14.5)
RBC # FLD: 12.1 % — SIGNIFICANT CHANGE UP (ref 10.3–14.5)
RBC # FLD: 12.1 % — SIGNIFICANT CHANGE UP (ref 10.3–14.5)
SODIUM SERPL-SCNC: 139 MMOL/L — SIGNIFICANT CHANGE UP (ref 135–145)
SODIUM SERPL-SCNC: 140 MMOL/L — SIGNIFICANT CHANGE UP (ref 135–145)
SODIUM SERPL-SCNC: 141 MMOL/L — SIGNIFICANT CHANGE UP (ref 135–145)
TOTAL CHOLESTEROL/HDL RATIO MEASUREMENT: 5 RATIO — SIGNIFICANT CHANGE UP (ref 3.4–9.6)
TRIGL SERPL-MCNC: 203 MG/DL — HIGH (ref 10–149)
TROPONIN T SERPL-MCNC: <0.01 NG/ML — SIGNIFICANT CHANGE UP (ref 0–0.06)
TSH SERPL-MCNC: 1.96 UIU/ML — SIGNIFICANT CHANGE UP (ref 0.27–4.2)
WBC # BLD: 8.1 K/UL — SIGNIFICANT CHANGE UP (ref 3.8–10.5)
WBC # BLD: 8.9 K/UL — SIGNIFICANT CHANGE UP (ref 3.8–10.5)
WBC # BLD: 9.2 K/UL — SIGNIFICANT CHANGE UP (ref 3.8–10.5)
WBC # FLD AUTO: 8.1 K/UL — SIGNIFICANT CHANGE UP (ref 3.8–10.5)
WBC # FLD AUTO: 8.9 K/UL — SIGNIFICANT CHANGE UP (ref 3.8–10.5)
WBC # FLD AUTO: 9.2 K/UL — SIGNIFICANT CHANGE UP (ref 3.8–10.5)

## 2017-08-09 PROCEDURE — 93010 ELECTROCARDIOGRAM REPORT: CPT

## 2017-08-09 PROCEDURE — 99233 SBSQ HOSP IP/OBS HIGH 50: CPT | Mod: GC

## 2017-08-09 PROCEDURE — 93306 TTE W/DOPPLER COMPLETE: CPT | Mod: 26

## 2017-08-09 RX ORDER — MAGNESIUM SULFATE 500 MG/ML
2 VIAL (ML) INJECTION ONCE
Qty: 0 | Refills: 0 | Status: COMPLETED | OUTPATIENT
Start: 2017-08-09 | End: 2017-08-09

## 2017-08-09 RX ADMIN — HEPARIN SODIUM 1300 UNIT(S)/HR: 5000 INJECTION INTRAVENOUS; SUBCUTANEOUS at 16:34

## 2017-08-09 RX ADMIN — Medication 50 GRAM(S): at 05:26

## 2017-08-09 RX ADMIN — HEPARIN SODIUM 1300 UNIT(S)/HR: 5000 INJECTION INTRAVENOUS; SUBCUTANEOUS at 22:25

## 2017-08-09 RX ADMIN — HEPARIN SODIUM 1300 UNIT(S)/HR: 5000 INJECTION INTRAVENOUS; SUBCUTANEOUS at 05:26

## 2017-08-09 NOTE — PROGRESS NOTE ADULT - SUBJECTIVE AND OBJECTIVE BOX
Patient is a 52y old  Male who presents with a chief complaint of     HPI:  51 y/o male PMH cardiac arrhythmia (?Afib) s/p ablation (9/2016) presents c/o exertional chest pressure and dyspnea with walking or climbing <1 flight of stairs over the past 1-2 weeks. Symptoms improve with rest. Pt reports he normally runs and plays sports regularly. Evaluated by PCP, Dr. Wakefield, and referred for cardiac catheterization today. Pt states he had echo yesterday (no results in chart). Pt started Toprol yesterday. He reports recent travel to [place]. (08 Aug 2017 11:46)    Overnight Event: No issues overnight    REVIEW OF SYSTEMS:  	    MEDICATIONS  (STANDING):  heparin  Infusion.  Unit(s)/Hr (15 mL/Hr) IV Continuous <Continuous>    MEDICATIONS  (PRN):  heparin  Injectable 6500 Unit(s) IV Push every 6 hours PRN For aPTT less than 40  heparin  Injectable 3000 Unit(s) IV Push every 6 hours PRN For aPTT between 40 - 57        PHYSICAL EXAM:  Vital Signs Last 24 Hrs  T(C): 36.7 (09 Aug 2017 05:00), Max: 36.7 (08 Aug 2017 21:30)  T(F): 98 (09 Aug 2017 05:00), Max: 98 (08 Aug 2017 21:30)  HR: 51 (09 Aug 2017 07:00) (46 - 69)  BP: 111/79 (09 Aug 2017 07:00) (111/79 - 138/88)  BP(mean): 89 (09 Aug 2017 07:00) (87 - 103)  RR: 19 (09 Aug 2017 06:00) (16 - 19)  SpO2: 95% (09 Aug 2017 07:00) (95% - 100%)  I&O's Summary    08 Aug 2017 07:01  -  09 Aug 2017 07:00  --------------------------------------------------------  IN: 768 mL / OUT: 900 mL / NET: -132 mL        Appearance: Normal	  HEENT:   Normal oral mucosa, PERRL, EOMI	  Lymphatic: No lymphadenopathy  Cardiovascular: Normal S1 S2, No JVD, No murmurs, No edema  Respiratory: Lungs clear to auscultation	  Psychiatry: A & O x 3, Mood & affect appropriate  Gastrointestinal:  Soft, Non-tender, + BS	  Skin: No rashes, No ecchymoses, No cyanosis	  Neurologic: Non-focal  Extremities: Normal range of motion, No clubbing, cyanosis or edema  Vascular: Peripheral pulses palpable 2+ bilaterally    LABS:	 	                        14.8   9.2   )-----------( 190      ( 09 Aug 2017 04:38 )             43.7     Auto Eosinophil # 0.6   / Auto Eosinophil % 6.4   / Auto Neutrophil # 5.5   / Auto Neutrophil % 59.7  / BANDS % x                            14.2   8.7   )-----------( 185      ( 08 Aug 2017 22:22 )             41.9     Auto Eosinophil # 0.5   / Auto Eosinophil % 5.6   / Auto Neutrophil # 5.5   / Auto Neutrophil % 63.8  / BANDS % x                            15.1   8.0   )-----------( 226      ( 08 Aug 2017 12:11 )             46.3     Auto Eosinophil # x     / Auto Eosinophil % x     / Auto Neutrophil # x     / Auto Neutrophil % x     / BANDS % x        INR: 1.13 ratio (08-09 @ 04:38)  INR: 1.15 ratio (08-08 @ 22:21)  INR: 1.14 ratio (08-08 @ 20:37)    08-09    141  |  102  |  13  ----------------------------<  96  4.0   |  26  |  0.90  08-08    141  |  102  |  15  ----------------------------<  110<H>  3.8   |  25  |  0.86  08-08    144  |  104  |  14  ----------------------------<  89  4.6   |  29  |  0.91    Ca    9.3      09 Aug 2017 04:38  Mg     1.8     08-09  Phos  4.0     08-09  TPro  6.2  /  Alb  3.9  /  TBili  0.5  /  DBili  x   /  AST  18  /  ALT  13  /  AlkPhos  78  08-09  TPro  6.1  /  Alb  3.8  /  TBili  0.4  /  DBili  x   /  AST  17  /  ALT  12  /  AlkPhos  74  08-08  TPro  7.0  /  Alb  4.4  /  TBili  0.6  /  DBili  x   /  AST  21  /  ALT  15  /  AlkPhos  81  08-08    CARDIAC MARKERS:   09 Aug 2017 04:38 Troponin <0.01 ng/mL / Creatine Kinase 89 U/L /  CKMB 1.2 ng/mL / CPK Mass Assay % 1.3 %   08 Aug 2017 22:22 Troponin <0.01 ng/mL / Creatine Kinase 90 U/L /  CKMB 1.2 ng/mL / CPK Mass Assay % 1.3 %        TELEMETRY: 	    ECG:  	  RADIOLOGY:  OTHER: 	    PREVIOUS DIAGNOSTIC TESTING:    [ ] Echocardiogram:  [ ]  Catheterization:  [ ] Stress Test:  	  	  SABI Arcos  Contact # [phone] Patient is a 52y old  Male who presents with a chief complaint of     HPI:  53 y/o male PMH cardiac arrhythmia (?Afib) s/p ablation (9/2016) presents c/o exertional chest pressure and dyspnea with walking or climbing <1 flight of stairs over the past 1-2 weeks. Symptoms improve with rest. Pt reports he normally runs and plays sports regularly. Evaluated by PCP, Dr. Wakefield, and referred for cardiac catheterization today. Pt states he had echo yesterday (no results in chart). Pt started Toprol yesterday. He reports recent travel to [place]. (08 Aug 2017 11:46)    Overnight Event: No issues overnight    REVIEW OF SYSTEMS: No chest pain or SOB  	    MEDICATIONS  (STANDING):  heparin  Infusion.  Unit(s)/Hr (15 mL/Hr) IV Continuous <Continuous>    MEDICATIONS  (PRN):  heparin  Injectable 6500 Unit(s) IV Push every 6 hours PRN For aPTT less than 40  heparin  Injectable 3000 Unit(s) IV Push every 6 hours PRN For aPTT between 40 - 57        PHYSICAL EXAM:  Vital Signs Last 24 Hrs  T(C): 36.7 (09 Aug 2017 05:00), Max: 36.7 (08 Aug 2017 21:30)  T(F): 98 (09 Aug 2017 05:00), Max: 98 (08 Aug 2017 21:30)  HR: 51 (09 Aug 2017 07:00) (46 - 69)  BP: 111/79 (09 Aug 2017 07:00) (111/79 - 138/88)  BP(mean): 89 (09 Aug 2017 07:00) (87 - 103)  RR: 19 (09 Aug 2017 06:00) (16 - 19)  SpO2: 95% (09 Aug 2017 07:00) (95% - 100%)  I&O's Summary    08 Aug 2017 07:01  -  09 Aug 2017 07:00  --------------------------------------------------------  IN: 768 mL / OUT: 900 mL / NET: -132 mL        Appearance: NAD	  HEENT:   Normal oral mucosa, PERRL  Cardiovascular: Normal S1 S2, No JVD, No murmurs, No edema  Respiratory: Lungs clear to auscultation	  Psychiatry: A & O x 3, Mood & affect appropriate  Gastrointestinal:  Soft, Non-tender, + BS	  Skin: No rashes, No ecchymoses, No cyanosis	  Neurologic: Non-focal  Extremities: Normal range of motion, No clubbing, cyanosis or edema  Vascular: Peripheral pulses palpable 2+ bilaterally    LABS:	 	                        14.8   9.2   )-----------( 190      ( 09 Aug 2017 04:38 )             43.7     Auto Eosinophil # 0.6   / Auto Eosinophil % 6.4   / Auto Neutrophil # 5.5   / Auto Neutrophil % 59.7  / BANDS % x                            14.2   8.7   )-----------( 185      ( 08 Aug 2017 22:22 )             41.9     Auto Eosinophil # 0.5   / Auto Eosinophil % 5.6   / Auto Neutrophil # 5.5   / Auto Neutrophil % 63.8  / BANDS % x                            15.1   8.0   )-----------( 226      ( 08 Aug 2017 12:11 )             46.3     Auto Eosinophil # x     / Auto Eosinophil % x     / Auto Neutrophil # x     / Auto Neutrophil % x     / BANDS % x        INR: 1.13 ratio (08-09 @ 04:38)  INR: 1.15 ratio (08-08 @ 22:21)  INR: 1.14 ratio (08-08 @ 20:37)    08-09    141  |  102  |  13  ----------------------------<  96  4.0   |  26  |  0.90  08-08    141  |  102  |  15  ----------------------------<  110<H>  3.8   |  25  |  0.86  08-08    144  |  104  |  14  ----------------------------<  89  4.6   |  29  |  0.91    Ca    9.3      09 Aug 2017 04:38  Mg     1.8     08-09  Phos  4.0     08-09  TPro  6.2  /  Alb  3.9  /  TBili  0.5  /  DBili  x   /  AST  18  /  ALT  13  /  AlkPhos  78  08-09  TPro  6.1  /  Alb  3.8  /  TBili  0.4  /  DBili  x   /  AST  17  /  ALT  12  /  AlkPhos  74  08-08  TPro  7.0  /  Alb  4.4  /  TBili  0.6  /  DBili  x   /  AST  21  /  ALT  15  /  AlkPhos  81  08-08    CARDIAC MARKERS:   09 Aug 2017 04:38 Troponin <0.01 ng/mL / Creatine Kinase 89 U/L /  CKMB 1.2 ng/mL / CPK Mass Assay % 1.3 %   08 Aug 2017 22:22 Troponin <0.01 ng/mL / Creatine Kinase 90 U/L /  CKMB 1.2 ng/mL / CPK Mass Assay % 1.3 %        ECG:  	Sinus bradycardia  RADIOLOGY: < from: [facility] Duplex Lower Ext Vein Scan, Bilat (08.08.17 @ 19:43) >  Above and below-the-knee deep venous thrombosis in the left lower   extremity.  No evidence of right DVT.    OTHER: CTA Chest; extensive bilateral pulmonary embolii. bowing of the interventricular septum may be concercing for right heart strain, which may be confirmed with echocardiogram.       PREVIOUS DIAGNOSTIC TESTING:    [ ]Catheterization: VENTRICLES: There were no left ventricular global or regional wall motion  abnormalities. EF estimated was 55 %.  VALVES: AORTIC VALVE: There was no significant aortic insufficiency.aorta  appears normal  CORONARY VESSELS: The coronary circulation is right dominant.  LM:   --  LM: Normal.  LAD:   --  LAD: Normal.  CX:   --  Circumflex: Normal.  RCA:   --  RCA: Normal.    	  TRACY ArcosAur[facility] West HospitalED  Contact # [phone]

## 2017-08-09 NOTE — PROGRESS NOTE ADULT - ATTENDING COMMENTS
Patient is seen and examined with fellow, NP and the CCU house-staff. I agree with the history, physical and the assessment and plan.  b/l PE - on Heparin gtt  TTE today  ambulate to assess for symptoms; hemodynamically stable

## 2017-08-09 NOTE — DOWNTIME INTERRUPTION NOTE - WHICH MANUAL FORMS INITIATED?
No orders made during down time all charting up to date
Sunrise down from 1200 to 1930 as per hospital overhead annoucement; no orders were entered during downtime; no medications were given during downtime without BCMA. I&O flowsheet and vitals updated on sunrise during downtime; no paper documentation for this patient.   However, laboratory [place] laboratory system down as per [place] in laboratory; results populated to sunrise and scanned at 1635. aPTT therapuetic. next aPTT due at 2204.

## 2017-08-09 NOTE — PROGRESS NOTE ADULT - SUBJECTIVE AND OBJECTIVE BOX
Subjective: Patient seen and examined. No new events except as noted.   Patient admitted last nite  recent travel to [place]  on return new onset of exertiona and rest SOB and chest pain  S/P afib ablation 1 year ao asymptomatic until last 1-2 weeks  cath normal LV normal; right heart cath, normal cors  elevated D dimer  CT PA bialteral pul emboli  Today anxious NAD sat normal on room air    MEDICATIONS:  MEDICATIONS  (STANDING):  heparin  Infusion.  Unit(s)/Hr (15 mL/Hr) IV Continuous <Continuous>      PHYSICAL EXAM:  T(C): 36.7 (08-09-17 @ 05:00), Max: 36.7 (08-08-17 @ 21:30)  HR: 62 (08-09-17 @ 08:00) (46 - 69)  BP: 125/84 (08-09-17 @ 08:00) (111/79 - 138/88)  RR: 19 (08-09-17 @ 06:00) (16 - 19)  SpO2: 98% (08-09-17 @ 08:00) (95% - 100%)  Wt(kg): --  I&O's Summary    08 Aug 2017 07:01  -  09 Aug 2017 07:00  --------------------------------------------------------  IN: 768 mL / OUT: 900 mL / NET: -132 mL      Height (cm): 180.34 (08-08 @ 21:30)  Weight (kg): 81.6 (08-08 @ 12:07)  BMI (kg/m2): 25.1 (08-08 @ 21:30)  BSA (m2): 2.02 (08-08 @ 21:30)    Appearance: Normal anxious	  HEENT:   Normal oral mucosa, PERRL, EOMI	  Cardiovascular: Normal S1 S2, No JVD, No murmurs ,P2 normal  Respiratory: Lungs clear to auscultation, normal effort 	  Gastrointestinal:  Soft, Non-tender, + BS	  Skin: No rashes, No ecchymoses, No cyanosis, warm to touch  Musculoskeletal: Normal range of motion, normal strength  Psychiatry:  Mood & affect appropriate  Ext: No edema  Peripheral pulses palpable 2+ bilaterally      LABS:    CARDIAC MARKERS:  CARDIAC MARKERS ( 09 Aug 2017 04:38 )  x     / <0.01 ng/mL / 89 U/L / x     / 1.2 ng/mL  CARDIAC MARKERS ( 08 Aug 2017 22:22 )  x     / <0.01 ng/mL / 90 U/L / x     / 1.2 ng/mL                                14.8   9.2   )-----------( 190      ( 09 Aug 2017 04:38 )             43.7     08-09    141  |  102  |  13  ----------------------------<  96  4.0   |  26  |  0.90    Ca    9.3      09 Aug 2017 04:38  Phos  4.0     08-09  Mg     1.8     08-09    TPro  6.2  /  Alb  3.9  /  TBili  0.5  /  DBili  x   /  AST  18  /  ALT  13  /  AlkPhos  78  08-09    proBNP:   Lipid Profile:   HgA1c:   TSH:           TELEMETRY: 	    ECG:  	  RADIOLOGY:   DIAGNOSTIC TESTING:  [ ] Echocardiogram:  [ ]  Catheterization:  [ ] Stress Test:    OTHER:

## 2017-08-10 VITALS — WEIGHT: 181.66 LBS

## 2017-08-10 DIAGNOSIS — I82.409 ACUTE EMBOLISM AND THROMBOSIS OF UNSPECIFIED DEEP VEINS OF UNSPECIFIED LOWER EXTREMITY: ICD-10-CM

## 2017-08-10 LAB
ALBUMIN SERPL ELPH-MCNC: 4 G/DL — SIGNIFICANT CHANGE UP (ref 3.3–5)
ALP SERPL-CCNC: 75 U/L — SIGNIFICANT CHANGE UP (ref 40–120)
ALT FLD-CCNC: 12 U/L RC — SIGNIFICANT CHANGE UP (ref 10–45)
ANION GAP SERPL CALC-SCNC: 13 MMOL/L — SIGNIFICANT CHANGE UP (ref 5–17)
AST SERPL-CCNC: 17 U/L — SIGNIFICANT CHANGE UP (ref 10–40)
BASOPHILS # BLD AUTO: 0.1 K/UL — SIGNIFICANT CHANGE UP (ref 0–0.2)
BASOPHILS NFR BLD AUTO: 0.7 % — SIGNIFICANT CHANGE UP (ref 0–2)
BILIRUB SERPL-MCNC: 0.4 MG/DL — SIGNIFICANT CHANGE UP (ref 0.2–1.2)
BUN SERPL-MCNC: 13 MG/DL — SIGNIFICANT CHANGE UP (ref 7–23)
CALCIUM SERPL-MCNC: 9 MG/DL — SIGNIFICANT CHANGE UP (ref 8.4–10.5)
CHLORIDE SERPL-SCNC: 101 MMOL/L — SIGNIFICANT CHANGE UP (ref 96–108)
CO2 SERPL-SCNC: 26 MMOL/L — SIGNIFICANT CHANGE UP (ref 22–31)
CREAT SERPL-MCNC: 0.91 MG/DL — SIGNIFICANT CHANGE UP (ref 0.5–1.3)
EOSINOPHIL # BLD AUTO: 0.7 K/UL — HIGH (ref 0–0.5)
EOSINOPHIL NFR BLD AUTO: 7.5 % — HIGH (ref 0–6)
GLUCOSE SERPL-MCNC: 95 MG/DL — SIGNIFICANT CHANGE UP (ref 70–99)
HCT VFR BLD CALC: 44.3 % — SIGNIFICANT CHANGE UP (ref 39–50)
HGB BLD-MCNC: 14.9 G/DL — SIGNIFICANT CHANGE UP (ref 13–17)
LYMPHOCYTES # BLD AUTO: 2.5 K/UL — SIGNIFICANT CHANGE UP (ref 1–3.3)
LYMPHOCYTES # BLD AUTO: 27.9 % — SIGNIFICANT CHANGE UP (ref 13–44)
MAGNESIUM SERPL-MCNC: 1.9 MG/DL — SIGNIFICANT CHANGE UP (ref 1.6–2.6)
MCHC RBC-ENTMCNC: 30.1 PG — SIGNIFICANT CHANGE UP (ref 27–34)
MCHC RBC-ENTMCNC: 33.6 GM/DL — SIGNIFICANT CHANGE UP (ref 32–36)
MCV RBC AUTO: 89.5 FL — SIGNIFICANT CHANGE UP (ref 80–100)
MONOCYTES # BLD AUTO: 0.7 K/UL — SIGNIFICANT CHANGE UP (ref 0–0.9)
MONOCYTES NFR BLD AUTO: 8.1 % — SIGNIFICANT CHANGE UP (ref 2–14)
NEUTROPHILS # BLD AUTO: 5 K/UL — SIGNIFICANT CHANGE UP (ref 1.8–7.4)
NEUTROPHILS NFR BLD AUTO: 55.8 % — SIGNIFICANT CHANGE UP (ref 43–77)
PHOSPHATE SERPL-MCNC: 3.5 MG/DL — SIGNIFICANT CHANGE UP (ref 2.5–4.5)
PLATELET # BLD AUTO: 207 K/UL — SIGNIFICANT CHANGE UP (ref 150–400)
POTASSIUM SERPL-MCNC: 4 MMOL/L — SIGNIFICANT CHANGE UP (ref 3.5–5.3)
POTASSIUM SERPL-SCNC: 4 MMOL/L — SIGNIFICANT CHANGE UP (ref 3.5–5.3)
PROT SERPL-MCNC: 6.3 G/DL — SIGNIFICANT CHANGE UP (ref 6–8.3)
RBC # BLD: 4.95 M/UL — SIGNIFICANT CHANGE UP (ref 4.2–5.8)
RBC # FLD: 12 % — SIGNIFICANT CHANGE UP (ref 10.3–14.5)
SODIUM SERPL-SCNC: 140 MMOL/L — SIGNIFICANT CHANGE UP (ref 135–145)
WBC # BLD: 9 K/UL — SIGNIFICANT CHANGE UP (ref 3.8–10.5)
WBC # FLD AUTO: 9 K/UL — SIGNIFICANT CHANGE UP (ref 3.8–10.5)

## 2017-08-10 PROCEDURE — C1894: CPT

## 2017-08-10 PROCEDURE — 86850 RBC ANTIBODY SCREEN: CPT

## 2017-08-10 PROCEDURE — 99238 HOSP IP/OBS DSCHRG MGMT 30/<: CPT

## 2017-08-10 PROCEDURE — 85730 THROMBOPLASTIN TIME PARTIAL: CPT

## 2017-08-10 PROCEDURE — 80061 LIPID PANEL: CPT

## 2017-08-10 PROCEDURE — 83036 HEMOGLOBIN GLYCOSYLATED A1C: CPT

## 2017-08-10 PROCEDURE — C1887: CPT

## 2017-08-10 PROCEDURE — 80053 COMPREHEN METABOLIC PANEL: CPT

## 2017-08-10 PROCEDURE — 71275 CT ANGIOGRAPHY CHEST: CPT

## 2017-08-10 PROCEDURE — 86900 BLOOD TYPING SEROLOGIC ABO: CPT

## 2017-08-10 PROCEDURE — 85610 PROTHROMBIN TIME: CPT

## 2017-08-10 PROCEDURE — 82553 CREATINE MB FRACTION: CPT

## 2017-08-10 PROCEDURE — 85027 COMPLETE CBC AUTOMATED: CPT

## 2017-08-10 PROCEDURE — 93005 ELECTROCARDIOGRAM TRACING: CPT

## 2017-08-10 PROCEDURE — 93306 TTE W/DOPPLER COMPLETE: CPT

## 2017-08-10 PROCEDURE — 93970 EXTREMITY STUDY: CPT

## 2017-08-10 PROCEDURE — 84100 ASSAY OF PHOSPHORUS: CPT

## 2017-08-10 PROCEDURE — 93567 NJX CAR CTH SPRVLV AORTGRPHY: CPT

## 2017-08-10 PROCEDURE — 99152 MOD SED SAME PHYS/QHP 5/>YRS: CPT

## 2017-08-10 PROCEDURE — 83735 ASSAY OF MAGNESIUM: CPT

## 2017-08-10 PROCEDURE — 82550 ASSAY OF CK (CPK): CPT

## 2017-08-10 PROCEDURE — 84443 ASSAY THYROID STIM HORMONE: CPT

## 2017-08-10 PROCEDURE — 93010 ELECTROCARDIOGRAM REPORT: CPT

## 2017-08-10 PROCEDURE — 85379 FIBRIN DEGRADATION QUANT: CPT

## 2017-08-10 PROCEDURE — 84484 ASSAY OF TROPONIN QUANT: CPT

## 2017-08-10 PROCEDURE — 86901 BLOOD TYPING SEROLOGIC RH(D): CPT

## 2017-08-10 PROCEDURE — C1769: CPT

## 2017-08-10 PROCEDURE — 99153 MOD SED SAME PHYS/QHP EA: CPT

## 2017-08-10 PROCEDURE — 93460 R&L HRT ART/VENTRICLE ANGIO: CPT | Mod: 59

## 2017-08-10 RX ORDER — APIXABAN 2.5 MG/1
5 TABLET, FILM COATED ORAL EVERY 12 HOURS
Qty: 0 | Refills: 0 | Status: DISCONTINUED | OUTPATIENT
Start: 2017-08-10 | End: 2017-08-10

## 2017-08-10 RX ORDER — APIXABAN 2.5 MG/1
10 TABLET, FILM COATED ORAL EVERY 12 HOURS
Qty: 0 | Refills: 0 | Status: DISCONTINUED | OUTPATIENT
Start: 2017-08-10 | End: 2017-08-10

## 2017-08-10 RX ORDER — METOPROLOL TARTRATE 50 MG
1 TABLET ORAL
Qty: 0 | Refills: 0 | COMMUNITY

## 2017-08-10 RX ORDER — APIXABAN 2.5 MG/1
2 TABLET, FILM COATED ORAL
Qty: 27 | Refills: 0
Start: 2017-08-10 | End: 2017-08-17

## 2017-08-10 RX ORDER — APIXABAN 2.5 MG/1
5 TABLET, FILM COATED ORAL EVERY 12 HOURS
Qty: 0 | Refills: 0 | Status: CANCELLED | OUTPATIENT
Start: 2017-08-17 | End: 2017-08-10

## 2017-08-10 RX ORDER — MAGNESIUM SULFATE 500 MG/ML
1 VIAL (ML) INJECTION ONCE
Qty: 0 | Refills: 0 | Status: COMPLETED | OUTPATIENT
Start: 2017-08-10 | End: 2017-08-10

## 2017-08-10 RX ADMIN — APIXABAN 10 MILLIGRAM(S): 2.5 TABLET, FILM COATED ORAL at 08:20

## 2017-08-10 RX ADMIN — Medication 100 GRAM(S): at 06:19

## 2017-08-10 NOTE — PROGRESS NOTE ADULT - SUBJECTIVE AND OBJECTIVE BOX
Patient is a 52y old  Male who presents with a chief complaint of HPI:  Chest pressure (10 Aug 2017 03:36)    HPI:  53 y/o male PMH; Afib s/p ablation (9/2016) presents c/o exertional chest pressure and dyspnea with walking or climbing <1 flight of stairs over the past 1-2 weeks. Symptoms improve with rest. Pt reports he normally runs and plays sports regularly. Evaluated by PCP, Dr. Wakefield, and referred for cardiac catheterization. S/P afib ablation 1 year ao asymptomatic until last 1-2 weeks  cath was normal LV normal; right heart cath, normal, elevated D dimer.    Overnight Event:    REVIEW OF SYSTEMS:  	    MEDICATIONS  (STANDING):  heparin  Infusion.  Unit(s)/Hr (15 mL/Hr) IV Continuous <Continuous>    MEDICATIONS  (PRN):  heparin  Injectable 6500 Unit(s) IV Push every 6 hours PRN For aPTT less than 40  heparin  Injectable 3000 Unit(s) IV Push every 6 hours PRN For aPTT between 40 - 57        PHYSICAL EXAM:  Vital Signs Last 24 Hrs  T(C): 36.7 (10 Aug 2017 06:00), Max: 36.7 (09 Aug 2017 11:00)  T(F): 98 (10 Aug 2017 06:00), Max: 98.1 (09 Aug 2017 19:00)  HR: 69 (10 Aug 2017 06:00) (59 - 89)  BP: 117/77 (10 Aug 2017 06:00) (110/67 - 137/81)  BP(mean): 88 (10 Aug 2017 06:00) (81 - 102)  RR: 18 (10 Aug 2017 06:00) (14 - 20)  SpO2: 94% (10 Aug 2017 06:00) (94% - 100%)  I&O's Summary    09 Aug 2017 07:01  -  10 Aug 2017 07:00  --------------------------------------------------------  IN: 899 mL / OUT: 0 mL / NET: 899 mL        Appearance: Normal	  HEENT:   Normal oral mucosa, PERRL, EOMI	  Lymphatic: No lymphadenopathy  Cardiovascular: Normal S1 S2, No JVD, No murmurs, No edema  Respiratory: Lungs clear to auscultation	  Psychiatry: A & O x 3, Mood & affect appropriate  Gastrointestinal:  Soft, Non-tender, + BS	  Skin: No rashes, No ecchymoses, No cyanosis	  Neurologic: Non-focal  Extremities: Normal range of motion, No clubbing, cyanosis or edema  Vascular: Peripheral pulses palpable 2+ bilaterally    LABS:	 	                        14.9   9.0   )-----------( 207      ( 10 Aug 2017 04:19 )             44.3     Auto Eosinophil # 0.7   / Auto Eosinophil % 7.5   / Auto Neutrophil # 5.0   / Auto Neutrophil % 55.8  / BANDS % x                            14.7   8.9   )-----------( 202      ( 09 Aug 2017 21:50 )             44.5     Auto Eosinophil # 0.5   / Auto Eosinophil % 5.7   / Auto Neutrophil # 5.4   / Auto Neutrophil % 60.8  / BANDS % x                            15.8   8.1   )-----------( 210      ( 09 Aug 2017 12:36 )             47.1     Auto Eosinophil # x     / Auto Eosinophil % x     / Auto Neutrophil # x     / Auto Neutrophil % x     / BANDS % x        INR: 1.16 ratio (08-09 @ 21:50)  INR: 1.11 ratio (08-09 @ 12:34)  INR: 1.13 ratio (08-09 @ 04:38)    08-10    140  |  101  |  13  ----------------------------<  95  4.0   |  26  |  0.91  08-09    140  |  101  |  13  ----------------------------<  107<H>  3.6   |  27  |  0.96  08-09    139  |  100  |  11  ----------------------------<  167<H>  4.4   |  24  |  0.84    Ca    9.0      10 Aug 2017 04:19  Mg     1.9     08-10  Phos  3.5     08-10  TPro  6.3  /  Alb  4.0  /  TBili  0.4  /  DBili  x   /  AST  17  /  ALT  12  /  AlkPhos  75  08-10  TPro  6.6  /  Alb  4.1  /  TBili  0.3  /  DBili  x   /  AST  17  /  ALT  14  /  AlkPhos  79  08-09  TPro  7.0  /  Alb  4.1  /  TBili  0.4  /  DBili  x   /  AST  26  /  ALT  14  /  AlkPhos  84  08-09        proBNP:   Lipid Profile: 08-09 Chol 176  HDL 35<L> Trig 203<H>  HgA1c: 5.2 % (08-09 @ 06:07)    TSH: Thyroid Stimulating Hormone, Serum: 1.96 uIU/mL (08-09 @ 06:12)      CARDIAC MARKERS:   09 Aug 2017 21:50 Troponin <0.01 ng/mL / Creatine Kinase 89 U/L /  CKMB 1.3 ng/mL / CPK Mass Assay % 1.5 %   09 Aug 2017 14:42 Troponin <0.01 ng/mL / Creatine Kinase 102 U/L /  CKMB 1.3 ng/mL / CPK Mass Assay % 1.3 %   09 Aug 2017 04:38 Troponin <0.01 ng/mL / Creatine Kinase 89 U/L /  CKMB 1.2 ng/mL / CPK Mass Assay % 1.3 %        TELEMETRY: 	    ECG:  	  RADIOLOGY:< from: VA Duplex Lower Ext Vein Scan, Bilat (08.08.17 @ 19:43) >  Above and below-the-knee deep venous thrombosis in the left lower   extremity.  No evidence of right DVT.    OTHER: < from: CT Angio Chest w/ IV Cont (08.08.17 @ 20:04) >  IMPRESSION:   Extensive bilateral pulmonary emboli.  Findings as above which may represent right heart strain.    PREVIOUS DIAGNOSTIC TESTING:    [ ] Echocardiogram:< from: Transthoracic Echocardiogram (08.09.17 @ 12:54) >  Conclusions:  1. Normal mitral valve with chordal sstolic anterior  motion. Mild mitral regurgitation.  2. Mildly calcified trileaflet aortic valve with normal  opening. No aortic valve regurgitation seen.  3. Hyperdynamic left ventricle. No segmental wall motion  abnormalities.  4. Mild right ventricular enlargement with normal right  ventricular systolicfunction. Basal RV diameterabout 4.2  cm.    [ ]  Catheterization:  [ ] Stress Test:  	  	  Amelia Huerta [facility]  Contact #[phone]

## 2017-08-10 NOTE — DISCHARGE NOTE ADULT - CARE PROVIDER_API CALL
Ismael Blair), Cardiovascular Disease; Internal Medicine; Interventional Cardiology  [address] [address]  Phone: [phone]  Fax: [phone]

## 2017-08-10 NOTE — DISCHARGE NOTE ADULT - CARE PLAN
Principal Discharge DX:	Bilateral pulmonary embolism Principal Discharge DX:	Bilateral pulmonary embolism  Goal:	To not experience worsening of pulmonary emboli symptoms which include fast heart rate, chest pressure,  and difficulty breathing.In addition,  reduce your risk of developing further deep vein clots in your legs by staying active, eating healthy, and avoiding prolonged periods of sitting still.  Instructions for follow-up, activity and diet:	Take your medicines exactly as instructed.Don’t skip doses.If you miss a dose,call your healthcare provider and ask what you should do.If your healthcare provider has instructed you to do so,wear elastic compression stockings.These may be purchased at your local pharmacy.Get up and get moving.While sitting for long periods of time,move your knees, ankles,feet,and toes.Stay at a healthy weight.Try to exercise at least 30 minutes on most days.When traveling by car,make frequent stops to get up and move around.On long airplane rides,get up and move around when possible.If you can’t get up,wiggle your toes,move your ankles and tighten your calves to keep your blood moving.SEEK MEDICAL CARE  BY CALLING 911or seek emergency help if you have symptoms of a pulmonary embolism including:Chest pain,Trouble breathing,Coughing up blood,Fainting,Blue color to skin,especially on the face,Dizziness,Rapid,pounding,or unusual heartbeat,Sweating more than usual,Unusual swelling or pain in your leg,arm,or other area(symptoms of a deep vein thrombosis).Follow up with your cardiologist within two weeks of leaving the hospital. Call to make an appointment.

## 2017-08-10 NOTE — PROGRESS NOTE ADULT - SUBJECTIVE AND OBJECTIVE BOX
Subjective: Patient seen and examined. No new events except as noted.   Patient seen at 7 30 AM and subsequently discharged  No CP No SOB feels well  echo no abnormalities no evidence of RV strain  troponin negative  MEDICATIONS:  MEDICATIONS  (STANDING):  apixaban 10 milliGRAM(s) Oral every 12 hours      PHYSICAL EXAM:  T(C): 36.4 (08-10-17 @ 07:58), Max: 36.7 (08-09-17 @ 11:00)  HR: 66 (08-10-17 @ 07:58) (59 - 89)  BP: 119/83 (08-10-17 @ 07:58) (110/67 - 137/81)  RR: 16 (08-10-17 @ 07:58) (14 - 20)  SpO2: 94% (08-10-17 @ 07:58) (94% - 100%)  Wt(kg): --  I&O's Summary    09 Aug 2017 07:01  -  10 Aug 2017 07:00  --------------------------------------------------------  IN: 899 mL / OUT: 0 mL / NET: 899 mL          Appearance: Normal	  HEENT:   Normal oral mucosa, PERRL, EOMI	  Cardiovascular: Normal S1 S2, No JVD, No murmurs ,  Respiratory: Lungs clear to auscultation, normal effort 	  Gastrointestinal:  Soft, Non-tender, + BS	  Skin: No rashes, No ecchymoses, No cyanosis, warm to touch  Musculoskeletal: Normal range of motion, normal strength  Psychiatry:  Mood & affect appropriate  Ext: No edema  Peripheral pulses palpable 2+ bilaterally      LABS:    CARDIAC MARKERS:  CARDIAC MARKERS ( 09 Aug 2017 21:50 )  x     / <0.01 ng/mL / 89 U/L / x     / 1.3 ng/mL  CARDIAC MARKERS ( 09 Aug 2017 14:42 )  x     / <0.01 ng/mL / 102 U/L / x     / 1.3 ng/mL  CARDIAC MARKERS ( 09 Aug 2017 04:38 )  x     / <0.01 ng/mL / 89 U/L / x     / 1.2 ng/mL  CARDIAC MARKERS ( 08 Aug 2017 22:22 )  x     / <0.01 ng/mL / 90 U/L / x     / 1.2 ng/mL                                14.9   9.0   )-----------( 207      ( 10 Aug 2017 04:19 )             44.3     08-10    140  |  101  |  13  ----------------------------<  95  4.0   |  26  |  0.91    Ca    9.0      10 Aug 2017 04:19  Phos  3.5     08-10  Mg     1.9     08-10    TPro  6.3  /  Alb  4.0  /  TBili  0.4  /  DBili  x   /  AST  17  /  ALT  12  /  AlkPhos  75  08-10    proBNP:   Lipid Profile:   HgA1c:   TSH:

## 2017-08-10 NOTE — DIETITIAN INITIAL EVALUATION ADULT. - OTHER INFO
No known food allergies or intolerances reported. Pt denies micronutrient supplementation PTA. Currently, pt with a good appetite and eating >75% of meals. Denies GI distress. Pt reports no difficulty chewing/swallowing. Pt receptive to review of heart healthy diet guidelines, written materials provided.

## 2017-08-10 NOTE — DISCHARGE NOTE ADULT - PATIENT PORTAL LINK FT
“You can access the Follow[employer] Patient Portal, offered by [facility], by registering with the following website: [email or web address]”

## 2017-08-10 NOTE — DISCHARGE NOTE ADULT - PLAN OF CARE
To not experience worsening of pulmonary emboli symptoms which include fast heart rate, chest pressure,  and difficulty breathing.In addition,  reduce your risk of developing further deep vein clots in your legs by staying active, eating healthy, and avoiding prolonged periods of sitting still. Take your medicines exactly as instructed.Don’t skip doses.If you miss a dose,call your healthcare provider and ask what you should do.If your healthcare provider has instructed you to do so,wear elastic compression stockings.These may be purchased at your local pharmacy.Get up and get moving.While sitting for long periods of time,move your knees, ankles,feet,and toes.Stay at a healthy weight.Try to exercise at least 30 minutes on most days.When traveling by car,make frequent stops to get up and move around.On long airplane rides,get up and move around when possible.If you can’t get up,wiggle your toes,move your ankles and tighten your calves to keep your blood moving.SEEK MEDICAL CARE  BY CALLING 911or seek emergency help if you have symptoms of a pulmonary embolism including:Chest pain,Trouble breathing,Coughing up blood,Fainting,Blue color to skin,especially on the face,Dizziness,Rapid,pounding,or unusual heartbeat,Sweating more than usual,Unusual swelling or pain in your leg,arm,or other area(symptoms of a deep vein thrombosis).Follow up with your cardiologist within two weeks of leaving the hospital. Call to make an appointment.

## 2017-08-10 NOTE — CHART NOTE - NSCHARTNOTEFT_GEN_A_CORE
====================  CCU MIDNIGHT ROUNDS  ====================    ANANDA SONG  [id number]    ====================  SUMMARY:   51 y/o male PMH cardiac arrhythmia (?Afib) s/p ablation (9/2016) who presented on 8/8/17 c/o exertional chest pressure and dyspnea with walking or climbing <1 flight of stairs over the past 1-2 weeks.  Evaluated by PCP, Dr. Wakefield, and referred for cardiac catheterization today. On 8/8 pt underwent cardiac cath w/ clean coronaries but found to have extensive LLE DVT w/ e/o RHS on echo. Pt was treated w/ heparin and repeat ECHO showed no e/o strain.     ====================  NEW EVENTS: No acute events. Pt asymptomatic w/ plan to start Eliquis in AM.     ====================  VITALS (Last 12 hrs):  ====================    T(C): 36.7 (08-09-17 @ 19:00), Max: 36.7 (08-09-17 @ 15:00)  HR: 69 (08-10-17 @ 00:00) (64 - 83)  BP: 124/75 (08-09-17 @ 22:00) (116/84 - 128/83)  BP(mean): 87 (08-09-17 @ 22:00) (86 - 99)  RR: 15 (08-10-17 @ 00:00) (15 - 18)  SpO2: 94% (08-10-17 @ 00:00) (94% - 100%)    TELEMETRY: GENIE 60    I&O's Summary    08 Aug 2017 07:01  -  09 Aug 2017 07:00  --------------------------------------------------------  IN: 768 mL / OUT: 900 mL / NET: -132 mL    09 Aug 2017 07:01  -  10 Aug 2017 00:25  --------------------------------------------------------  IN: 821 mL / OUT: 0 mL / NET: 821 mL    ====================  PLAN:  ====================    HEALTH ISSUES - PROBLEM Dx:  Bilateral pulmonary embolism:Plan for liquis in AM w/ plan for discharge home. Will follow up w/ DR. Blair.     Reyna Hernandez, [facility] NP   #[phone]

## 2017-08-10 NOTE — DIETITIAN INITIAL EVALUATION ADULT. - NS AS NUTRI INTERV ED CONTENT
Reviewed heart healthy diet guidelines, written materials provided. Pt made aware RD remains available as needed./Nutrition relationship to health/disease/Recommended modifications/Purpose of the nutrition education

## 2017-08-10 NOTE — DISCHARGE NOTE ADULT - NS AS ACTIVITY OBS
Bathing allowed/Showering allowed/Walking-Indoors allowed/No Heavy lifting/straining/Walking-Outdoors allowed/Stairs allowed

## 2017-08-10 NOTE — DIETITIAN INITIAL EVALUATION ADULT. - ADHERENCE
Pt states he follows an unrestricted diet PTA, but does limit salt intake. Pt states that he does limit salt intake, and wife does the cooking at home. Pt describes diet as containing fruit, vegetables, whole grains, chicken, and red meat.

## 2017-08-10 NOTE — DISCHARGE NOTE ADULT - INSTRUCTIONS
You should continue to maintain a heart healthy diet ( low cholesterol, low sodium, and low in saturated fats).

## 2017-08-10 NOTE — PROGRESS NOTE ADULT - ASSESSMENT
1. acute pulmonary embolus as cause of SOB  2. Left leg DVT  3. etiology probably related to recent travel  4. hemodynamically stable    recommend  IV heparin  check 2 D echo re: right heart strain  discharge in AM if stable and switch to eliquis 10 BID for 1 week and thern 5 BID.
1. acute pulmonary embolus as cause of SOB  2. Left leg DVT  3. etiology probably related to recent travel  4. hemodynamically stable  5. no evidence of RV strain    recommend  discharge home eliquis 10 BID for 1 week and then  5 BID.  followup with Dr. Wakefield in 1 week
51 y/o male PMH cardiac arrhythmia (?Afib with recent prolonged airflight.  Admitted with extensive bilateral pulmonary embolism
53 y/o male PMH cardiac arrhythmia (?Afib with recent prolonged airflight.  Admitted with extensive bilateral pulmonary embolism

## 2017-08-10 NOTE — DIETITIAN INITIAL EVALUATION ADULT. - ENERGY NEEDS
Ht:1ky39gz, Wt:181.6pounds (adm), BMI:25.4,   IBW:172pounds (+/-10%), 105%IBW  No pressure ulcers or edema noted.   Pertinent Information: Ot with extensive bilateral pulmonary embolism as per chart.

## 2017-08-10 NOTE — PROGRESS NOTE ADULT - PROBLEM SELECTOR PLAN 1
discharge in AM if stable and switch to eliquis 10 BID for 1 week and thern 5 BID.
-continue to observe until tomorrow  -heparin drip until tomorrow AM  -then transition to Eliquis  -Ambulate to determine if symptomatic  -TTE today to evaluate RV strain

## 2017-08-10 NOTE — DISCHARGE NOTE ADULT - MEDICATION SUMMARY - MEDICATIONS TO TAKE
I will START or STAY ON the medications listed below when I get home from the hospital:    apixaban 5 mg oral tablet  -- 2 tab(s) by mouth every 12 hours  -- Indication: For Bilateral pulmonary embolism    apixaban 5 mg oral tablet  -- 1 tab(s) by mouth every 12 hours  -- Indication: For Bilateral pulmonary embolism

## 2017-08-10 NOTE — DISCHARGE NOTE ADULT - OTHER SIGNIFICANT FINDINGS
CTA Chest 8/8   Extensive B/L PEs from: CT Angio Chest w/ IV Cont (08.08.17 @ 20:04) >  IMPRESSION:   Extensive bilateral pulmonary emboli.    < from: VA Duplex Lower Ext Vein Scan, Bilat (08.08.17 @ 19:43) >  Above and below-the-knee deep venous thrombosis in the left lower   extremity.  No evidence of right DVT.    < from: Transthoracic Echocardiogram (08.09.17 @ 12:54) >  Conclusions:  1. Normal mitral valve with chordal sstolic anterior  motion. Mild mitral regurgitation.  2. Mildly calcified trileaflet aortic valve with normal  opening. No aortic valve regurgitation seen.  3. Hyperdynamic left ventricle. No segmental wall motion  abnormalities.  4. Mild right ventricular enlargement with normal right  ventricular systolicfunction. Basal RV diameterabout 4.2  cm.

## 2017-08-17 RX ORDER — APIXABAN 2.5 MG/1
1 TABLET, FILM COATED ORAL
Qty: 180 | Refills: 0
Start: 2017-08-17 | End: 2017-11-15

## 2018-12-14 NOTE — DISCHARGE NOTE ADULT - NURSING SECTION COMPLETE
Post-Op Assessment Note      CV Status:  Stable    Mental Status:  Alert    Hydration Status:  Stable    PONV Controlled:  None    Airway Patency:  Patent        Staff: CRNA           BP   153/84   Temp  97 5   Pulse  70   Resp   16   SpO2   99 Patient/Caregiver provided printed discharge information.

## 2020-07-21 PROBLEM — Z00.00 ENCOUNTER FOR PREVENTIVE HEALTH EXAMINATION: Status: ACTIVE | Noted: 2020-07-21

## 2020-07-22 ENCOUNTER — APPOINTMENT (OUTPATIENT)
Dept: PULMONOLOGY | Facility: CLINIC | Age: 55
End: 2020-07-22
Payer: COMMERCIAL

## 2020-07-22 VITALS
DIASTOLIC BLOOD PRESSURE: 87 MMHG | OXYGEN SATURATION: 95 % | WEIGHT: 181 LBS | HEART RATE: 77 BPM | HEIGHT: 71 IN | SYSTOLIC BLOOD PRESSURE: 128 MMHG | BODY MASS INDEX: 25.34 KG/M2 | TEMPERATURE: 98 F

## 2020-07-22 PROBLEM — I49.9 CARDIAC ARRHYTHMIA, UNSPECIFIED: Chronic | Status: ACTIVE | Noted: 2017-08-08

## 2020-07-22 PROCEDURE — 99244 OFF/OP CNSLTJ NEW/EST MOD 40: CPT | Mod: 25

## 2020-07-22 PROCEDURE — 71046 X-RAY EXAM CHEST 2 VIEWS: CPT

## 2020-07-22 RX ORDER — LORATADINE 5 MG/5 ML
10 SOLUTION, ORAL ORAL
Refills: 0 | Status: ACTIVE | COMMUNITY

## 2020-07-22 NOTE — PHYSICAL EXAM
[No Acute Distress] : no acute distress [Normal Oropharynx] : normal oropharynx [Normal Appearance] : normal appearance [Supple] : supple [No Neck Mass] : no neck mass [No JVD] : no jvd [No Murmurs] : no murmurs [Normal S1, S2] : normal s1, s2 [Clear to Auscultation Bilaterally] : clear to auscultation bilaterally [Normal to Percussion] : normal to percussion [Benign] : benign [No Clubbing] : no clubbing [No Edema] : no edema [Oriented x3] : oriented x3 [No Cyanosis] : no cyanosis

## 2020-07-23 NOTE — DISCUSSION/SUMMARY
[FreeTextEntry1] : SOB of unclear etiology.  Patient does have a history pulmonary embolic disease however had a negative d-dimer.  Dyspnea has improved could be related to COVID or mild inflammatory bronchitis.\par Cough likely related to allergy.  Rule out COVID.  Possible inflammatory bronchitis.  Rule out component related to GE reflux.

## 2020-07-23 NOTE — HISTORY OF PRESENT ILLNESS
[Never] : never [TextBox_4] : 2 weeks ago with deep breath noted tickle and cough. Went to [facility] had COVID test waiting for result. Given inhaler and CLaritin and felt better.\par Past few days walking up steps notes ROSAS. Nagging cough. No wheeze. Mild chest discomfort.\par Saw Dr. Blair yesterday had evaluation. \par Had D dimer told normal\par Not ill.\par No GERD.\par Is better today. Worse in very hot weather. \par \par CXR 1 week ago at [facility].\par \par Seen years ago for PE after long trip. \par \par Past pulmonary history. PE\par Occupational Exposure. N\par Family history of pulmonary disease. N\par Recent travel N\par Pets  N\par  \par

## 2020-07-23 NOTE — ASSESSMENT
[FreeTextEntry1] : Medrol Dosepak.\par Obtain labs.\par Consider PPI if no response to above.\par Awaiting COVID swab.  Recommend antibody test in the future.\par Touch base in 1 week or sooner on a as needed basis.

## 2020-07-23 NOTE — CONSULT LETTER
[Dear  ___] : Dear ~JAMIE, [Consult Letter:] : I had the pleasure of evaluating your patient, [unfilled]. [Please see my note below.] : Please see my note below. [Consult Closing:] : Thank you very much for allowing me to participate in the care of this patient.  If you have any questions, please do not hesitate to contact me. [FreeTextEntry2] : Ismael Blair MD [Sincerely,] : Sincerely, [FreeTextEntry3] : Vicente Rivers MD FCCP\par

## 2020-07-24 ENCOUNTER — INPATIENT (INPATIENT)
Facility: HOSPITAL | Age: 55
LOS: 1 days | Discharge: HOME | DRG: 176 | End: 2020-07-26
Attending: INTERNAL MEDICINE | Admitting: INTERNAL MEDICINE
Payer: COMMERCIAL

## 2020-07-24 VITALS
HEIGHT: 71 IN | HEART RATE: 80 BPM | RESPIRATION RATE: 18 BRPM | SYSTOLIC BLOOD PRESSURE: 151 MMHG | DIASTOLIC BLOOD PRESSURE: 100 MMHG | OXYGEN SATURATION: 94 % | TEMPERATURE: 98 F | WEIGHT: 179.9 LBS

## 2020-07-24 DIAGNOSIS — Z98.890 OTHER SPECIFIED POSTPROCEDURAL STATES: Chronic | ICD-10-CM

## 2020-07-24 DIAGNOSIS — I26.99 OTHER PULMONARY EMBOLISM WITHOUT ACUTE COR PULMONALE: ICD-10-CM

## 2020-07-24 DIAGNOSIS — R09.89 OTHER SPECIFIED SYMPTOMS AND SIGNS INVOLVING THE CIRCULATORY AND RESPIRATORY SYSTEMS: ICD-10-CM

## 2020-07-24 DIAGNOSIS — Z90.49 ACQUIRED ABSENCE OF OTHER SPECIFIED PARTS OF DIGESTIVE TRACT: Chronic | ICD-10-CM

## 2020-07-24 LAB
ALBUMIN SERPL ELPH-MCNC: 4.3 G/DL — SIGNIFICANT CHANGE UP (ref 3.3–5)
ALP SERPL-CCNC: 81 U/L — SIGNIFICANT CHANGE UP (ref 40–120)
ALT FLD-CCNC: 13 U/L — SIGNIFICANT CHANGE UP (ref 10–45)
ANION GAP SERPL CALC-SCNC: 13 MMOL/L — SIGNIFICANT CHANGE UP (ref 5–17)
APTT BLD: 33.6 SEC — SIGNIFICANT CHANGE UP (ref 27.5–35.5)
AST SERPL-CCNC: 15 U/L — SIGNIFICANT CHANGE UP (ref 10–40)
BASOPHILS # BLD AUTO: 0.03 K/UL — SIGNIFICANT CHANGE UP (ref 0–0.2)
BASOPHILS NFR BLD AUTO: 0.3 % — SIGNIFICANT CHANGE UP (ref 0–2)
BILIRUB SERPL-MCNC: 0.2 MG/DL — SIGNIFICANT CHANGE UP (ref 0.2–1.2)
BUN SERPL-MCNC: 19 MG/DL — SIGNIFICANT CHANGE UP (ref 7–23)
CALCIUM SERPL-MCNC: 9.3 MG/DL — SIGNIFICANT CHANGE UP (ref 8.4–10.5)
CHLORIDE SERPL-SCNC: 100 MMOL/L — SIGNIFICANT CHANGE UP (ref 96–108)
CO2 SERPL-SCNC: 22 MMOL/L — SIGNIFICANT CHANGE UP (ref 22–31)
CREAT SERPL-MCNC: 0.8 MG/DL — SIGNIFICANT CHANGE UP (ref 0.5–1.3)
CRP SERPL-MCNC: <0.6 MG/DL — HIGH (ref 0–0.4)
EOSINOPHIL # BLD AUTO: 0.01 K/UL — SIGNIFICANT CHANGE UP (ref 0–0.5)
EOSINOPHIL NFR BLD AUTO: 0.1 % — SIGNIFICANT CHANGE UP (ref 0–6)
GLUCOSE SERPL-MCNC: 128 MG/DL — HIGH (ref 70–99)
HCT VFR BLD CALC: 44.7 % — SIGNIFICANT CHANGE UP (ref 39–50)
HGB BLD-MCNC: 14 G/DL — SIGNIFICANT CHANGE UP (ref 13–17)
IMM GRANULOCYTES NFR BLD AUTO: 0.5 % — SIGNIFICANT CHANGE UP (ref 0–1.5)
INR BLD: 1.22 RATIO — HIGH (ref 0.88–1.16)
LYMPHOCYTES # BLD AUTO: 1.08 K/UL — SIGNIFICANT CHANGE UP (ref 1–3.3)
LYMPHOCYTES # BLD AUTO: 9.8 % — LOW (ref 13–44)
MAGNESIUM SERPL-MCNC: 2 MG/DL — SIGNIFICANT CHANGE UP (ref 1.6–2.6)
MCHC RBC-ENTMCNC: 27.2 PG — SIGNIFICANT CHANGE UP (ref 27–34)
MCHC RBC-ENTMCNC: 31.3 GM/DL — LOW (ref 32–36)
MCV RBC AUTO: 86.8 FL — SIGNIFICANT CHANGE UP (ref 80–100)
MONOCYTES # BLD AUTO: 0.4 K/UL — SIGNIFICANT CHANGE UP (ref 0–0.9)
MONOCYTES NFR BLD AUTO: 3.6 % — SIGNIFICANT CHANGE UP (ref 2–14)
NEUTROPHILS # BLD AUTO: 9.45 K/UL — HIGH (ref 1.8–7.4)
NEUTROPHILS NFR BLD AUTO: 85.7 % — HIGH (ref 43–77)
NRBC # BLD: 0 /100 WBCS — SIGNIFICANT CHANGE UP (ref 0–0)
PHOSPHATE SERPL-MCNC: 2.4 MG/DL — LOW (ref 2.5–4.5)
PLATELET # BLD AUTO: 311 K/UL — SIGNIFICANT CHANGE UP (ref 150–400)
POTASSIUM SERPL-MCNC: 3.9 MMOL/L — SIGNIFICANT CHANGE UP (ref 3.5–5.3)
POTASSIUM SERPL-SCNC: 3.9 MMOL/L — SIGNIFICANT CHANGE UP (ref 3.5–5.3)
PROT SERPL-MCNC: 6.7 G/DL — SIGNIFICANT CHANGE UP (ref 6–8.3)
PROTHROM AB SERPL-ACNC: 14.4 SEC — HIGH (ref 10.6–13.6)
RBC # BLD: 5.15 M/UL — SIGNIFICANT CHANGE UP (ref 4.2–5.8)
RBC # FLD: 13.3 % — SIGNIFICANT CHANGE UP (ref 10.3–14.5)
SODIUM SERPL-SCNC: 135 MMOL/L — SIGNIFICANT CHANGE UP (ref 135–145)
WBC # BLD: 11.03 K/UL — HIGH (ref 3.8–10.5)
WBC # FLD AUTO: 11.03 K/UL — HIGH (ref 3.8–10.5)

## 2020-07-24 PROCEDURE — 99285 EMERGENCY DEPT VISIT HI MDM: CPT

## 2020-07-24 PROCEDURE — 99223 1ST HOSP IP/OBS HIGH 75: CPT

## 2020-07-24 RX ORDER — HEPARIN SODIUM 5000 [USP'U]/ML
INJECTION INTRAVENOUS; SUBCUTANEOUS
Qty: 25000 | Refills: 0 | Status: DISCONTINUED | OUTPATIENT
Start: 2020-07-24 | End: 2020-07-26

## 2020-07-24 RX ORDER — HEPARIN SODIUM 5000 [USP'U]/ML
3000 INJECTION INTRAVENOUS; SUBCUTANEOUS EVERY 6 HOURS
Refills: 0 | Status: DISCONTINUED | OUTPATIENT
Start: 2020-07-24 | End: 2020-07-26

## 2020-07-24 RX ORDER — HEPARIN SODIUM 5000 [USP'U]/ML
6500 INJECTION INTRAVENOUS; SUBCUTANEOUS EVERY 6 HOURS
Refills: 0 | Status: DISCONTINUED | OUTPATIENT
Start: 2020-07-24 | End: 2020-07-26

## 2020-07-24 RX ADMIN — HEPARIN SODIUM 1500 UNIT(S)/HR: 5000 INJECTION INTRAVENOUS; SUBCUTANEOUS at 23:31

## 2020-07-24 NOTE — H&P ADULT - HISTORY OF PRESENT ILLNESS
55 year old   male w/pmh  Afib s/p ablation 2016, osteoarthritis,  h/o PE (2017) previously on  on Eliquis , presents for  pulmonary embolus found on outpatient CTA as part of work up for ongoing chest pain and dyspnea on exertion over the past  week. 55 year old   male w/pmh  Afib s/p ablation 2016, osteoarthritis,  h/o PE (2017) previously on  on Eliquis , presents for  pulmonary embolus found on outpatient CTA as part of work up for ongoing chest pain and dyspnea on exertion over the past  week.   Patient reports first feeling unwell about two weeks prior to admission, he reports increased fatigued as well as a nonproductive cough with deep breathing. Patient was evaluated at an urgent care at the time and CXR was noted to be clear. He was treated with Claritin and albuterol inhaler. However, his symptoms persisted and for the past week patient reports worsening dyspnea with exertion as well midsternal chest pressure. He reports being seen by his PMD  and patient was started on a course of steroids for possible bronchitis. Patient was referred to pulmonology who recommended restarting Eliquis and obtain a ddimer which retuned elevated. On day of admission patient obtained a CT chest which showed bilateral pulmonary emboli , and patient was subsequently referred to the hospital. Patient reports a history of PE after a [place] about 3 years prior to admission , he was treated with Eliquis at that time. Patient reports no fever or chills no recent travel.

## 2020-07-24 NOTE — ED PROVIDER NOTE - CHPI ED SYMPTOMS NEG
no chills/no fever/no lightheadedness, no fainting, no palpitations, no abd pain, no n/v/d, no urinary sx

## 2020-07-24 NOTE — H&P ADULT - NSHPSOCIALHISTORY_GEN_ALL_CORE
Social History:    Marital Status:  (  x )    (   ) Single    (   )    (  )   Occupation:   Lives with: (  ) alone  ( x ) children   ( x ) spouse   (  ) parents  (  ) other    Substance Use (street drugs): ( x ) never used  (  ) other:  Tobacco Usage:  (  x ) never smoked   (   ) former smoker   (   ) current smoker  (     ) pack year  (        ) last cigarette date  Alcohol Usage: no etoh use

## 2020-07-24 NOTE — ED CLERICAL - NS ED CLERK NOTE PRE-ARRIVAL INFORMATION; ADDITIONAL PRE-ARRIVAL INFORMATION
CC/Reason For referral: acute large pulmonary embolism r/o covid  Preferred Consultant(if applicable): pulmonary service  Who admits for you (if needed): DR. Gutierrez  Do you have documents you would like to fax over?  Would you still like to speak to an ED attending? yes

## 2020-07-24 NOTE — ED ADULT NURSE NOTE - CHPI ED NUR SYMPTOMS NEG
no weakness/no tingling/no vomiting/no fever/no dizziness/no nausea/no decreased eating/drinking/no chills

## 2020-07-24 NOTE — H&P ADULT - NSHPPHYSICALEXAM_GEN_ALL_CORE
Vital Signs Last 24 Hrs  T(C): 36.6 (24 Jul 2020 23:49), Max: 36.7 (24 Jul 2020 20:04)  T(F): 97.9 (24 Jul 2020 23:49), Max: 98.1 (24 Jul 2020 20:04)  HR: 65 (24 Jul 2020 23:49) (65 - 80)  BP: 135/87 (24 Jul 2020 23:49) (132/92 - 151/100)  BP(mean): --  RR: 18 (24 Jul 2020 23:49) (18 - 18)  SpO2: 96% (24 Jul 2020 23:49) (94% - 99%) Vital Signs Last 24 Hrs  T(C): 36.6 (24 Jul 2020 23:49), Max: 36.7 (24 Jul 2020 20:04)  T(F): 97.9 (24 Jul 2020 23:49), Max: 98.1 (24 Jul 2020 20:04)  HR: 65 (24 Jul 2020 23:49) (65 - 80)  BP: 135/87 (24 Jul 2020 23:49) (132/92 - 151/100)  BP(mean): --  RR: 18 (24 Jul 2020 23:49) (18 - 18)  SpO2: 96% (24 Jul 2020 23:49) (94% - 99%)    GENERAL: No acute distress, well-developed  HEAD:  Atraumatic, Normocephalic  ENT: EOMI, PERRLA, conjunctiva and sclera clear,  moist mucosa no pharyngeal erythema or exudates   NECK: supple , no JVD   CHEST/LUNG: Clear to auscultation bilaterally; No wheeze, equal breath sounds bilaterally   BACK: No spinal tenderness,  No CVA tenderness   HEART: Regular rate and rhythm; No murmurs, rubs, or gallops  ABDOMEN: Soft, Nontender, Nondistended; Bowel sounds present  EXTREMITIES:  No clubbing, cyanosis, or edema  MSK: No joint swelling or effusions, ROM intact   PSYCH: Normal behavior/affect  NEUROLOGY: AAOx3, non-focal, cranial nerves intact  SKIN: Normal color, No rashes or lesions

## 2020-07-24 NOTE — ED PROVIDER NOTE - ATTENDING CONTRIBUTION TO CARE
Attending MD [place].  Agree with above.  Pt is a 56 yo male with pmhx of provoked PE in 2017, afib ablated in 2016 and arthritis.  Pt had a week of dry cough followed by 1 wk of worsening ROSAS. No fever/infectious sxs.  Had a COVID swab last week without findings, had Dimer elevated outpt and had CTA today read as acute bilateral PE’s.  Pt is hemodynamically stable and was sent to ED for new acute PE’s without clear provocation.  No known hx of COVID.  No LE edema/recent travel.  Pt is well appearing and asymptomatic at rest.  Planned coags/labs.  Pt presents with discs/read of his CTA.  Planned heparin drip and admission to Dr. Gutierrez per pulm request.

## 2020-07-24 NOTE — H&P ADULT - NSHPREVIEWOFSYSTEMS_GEN_ALL_CORE
CONSTITUTIONAL: + weakness, no fevers or chills  EYES/ENT: No visual changes;  No dysphagia  NECK: No pain or stiffness  RESPIRATORY: + cough, no wheezing, no hemoptysis; + shortness of breath  CARDIOVASCULAR: + chest pain  no  palpitations; No lower extremity edema  EXTREMITIES: no le edema, cyanosis, clubbing  GASTROINTESTINAL: No abdominal or epigastric pain. No nausea, vomiting, or hematemesis; No diarrhea or constipation. No melena or hematochezia.  BACK: No back pain  GENITOURINARY: No dysuria, frequency or hematuria  NEUROLOGICAL: No numbness or weakness  MSK: no joint swelling or pain  SKIN: No itching, burning, rashes, or lesions   PSYCH: no agitation  All other review of systems is negative unless indicated above.

## 2020-07-24 NOTE — ED PROVIDER NOTE - CLINICAL SUMMARY MEDICAL DECISION MAKING FREE TEXT BOX
Robin Junior (Resident): 55y M Afib ablated 2016, osteoarthritis, prior PE 2017 provoked was on Eliquis for 6m sent in ED by pulmonologist for acute PE found on CTA today. Pt O2 sat 92 on room air, o/w vitals stable. Will obtain basic labs, CRP, ferritin, covid PCR, coag, upload outside films, and likely start heparin drip and admit.

## 2020-07-24 NOTE — ED PROVIDER NOTE - PROGRESS NOTE DETAILS
Sandi EM/IM PGY3: Updated patient's pulmonologist Dr. Celis on labwork and plan for admission on hep gtt with further hypercoag w/u which he agreed with. Requested admission under Dr. Gutierrez. Pt endorsed to Dr. Gutierrez for admission.

## 2020-07-24 NOTE — H&P ADULT - PROBLEM SELECTOR PLAN 1
no evidence of RHS on CTA , normotensive , mildly hypoxemic;   - continue heparin infusion  - transthoracic echocardiogram   - check troponin w/ am labs

## 2020-07-24 NOTE — H&P ADULT - NSHPLABSRESULTS_GEN_ALL_CORE
Labs personally reviewed:                          14.0   11.03 )-----------( 311      ( 24 Jul 2020 20:56 )             44.7     07-24    135  |  100  |  19  ----------------------------<  128<H>  3.9   |  22  |  0.80    Ca    9.3      24 Jul 2020 20:56  Phos  2.4     07-24  Mg     2.0     07-24    TPro  6.7  /  Alb  4.3  /  TBili  0.2  /  DBili  x   /  AST  15  /  ALT  13  /  AlkPhos  81  07-24        LIVER FUNCTIONS - ( 24 Jul 2020 20:56 )  Alb: 4.3 g/dL / Pro: 6.7 g/dL / ALK PHOS: 81 U/L / ALT: 13 U/L / AST: 15 U/L / GGT: x           PT/INR - ( 24 Jul 2020 20:56 )   PT: 14.4 sec;   INR: 1.22 ratio         PTT - ( 24 Jul 2020 20:56 )  PTT:33.6 sec    CAPILLARY BLOOD GLUCOSE          Imaging:  CTA chest:     EKG personally reviewed: nsr at 64 bpm , no acute st changes Labs personally reviewed:                          14.0   11.03 )-----------( 311      ( 24 Jul 2020 20:56 )             44.7     07-24    135  |  100  |  19  ----------------------------<  128<H>  3.9   |  22  |  0.80    Ca    9.3      24 Jul 2020 20:56  Phos  2.4     07-24  Mg     2.0     07-24    TPro  6.7  /  Alb  4.3  /  TBili  0.2  /  DBili  x   /  AST  15  /  ALT  13  /  AlkPhos  81  07-24        LIVER FUNCTIONS - ( 24 Jul 2020 20:56 )  Alb: 4.3 g/dL / Pro: 6.7 g/dL / ALK PHOS: 81 U/L / ALT: 13 U/L / AST: 15 U/L / GGT: x           PT/INR - ( 24 Jul 2020 20:56 )   PT: 14.4 sec;   INR: 1.22 ratio         PTT - ( 24 Jul 2020 20:56 )  PTT:33.6 sec    CAPILLARY BLOOD GLUCOSE          Imaging:  CTA chest report reviewed: Diffuse bilateral pulmonary emboli  without evidence of right heart strain, opacities in lateral left lung which may represent infarcts    EKG personally reviewed: nsr at 64 bpm , s1 T III , incomplete RBBB

## 2020-07-24 NOTE — ED PROVIDER NOTE - PHYSICAL EXAMINATION
GEN: NAD, awake, eyes open spontaneously  HEENT: NCAT, MMM, Trachea midline, normal conjunctiva, perrl  CHEST/LUNGS: Non-tachypneic, CTAB, bilateral breath sounds  CARDIAC: Non-tachycardic, normal perfusion  ABDOMEN: Soft, NTND, No rebound/guarding  MSK: No edema, no gross deformity of extremities  SKIN: No rashes, no petechiae, no vesicles  NEURO: CN grossly intact, normal coordination, no focal motor or sensory deficits  PSYCH: Alert, appropriate, cooperative, with capacity and insight

## 2020-07-24 NOTE — PATIENT PROFILE ADULT - DO YOU FEEL THREATENED BY OTHERS?
Follow up with Dr. Capone in 6 months with CT scan prior to appointment.    It was a pleasure meeting with you today.  Thank you for allowing me and my team the privilege of caring for you today.  YOU are the reason we are here, and I truly hope we provided you with the excellent service you deserve.  Please let us know if there is anything else we can do for you so that we can be sure you are leaving completely satisfied with your care experience.        TEMI Doherty  
no

## 2020-07-24 NOTE — ED PROVIDER NOTE - OBJECTIVE STATEMENT
55y M Afib ablated 2016, osteoarthritis, prior PE 2017 provoked was on Eliquis for 6m sent in ED by pulmonologist for acute PE found on CTA today. Pt reports x2 weeks of dry nonproductive cough assoc w/ one week of worsening ROSAS and mild CP. Asx at rest. Denies fever, chills, recent travel, lightheadedness, fainting, palpitations, abd pain, n/v/d, urinary sx, or lower ext swelling. Was seen by PMD Dr. Blair one week ago. Referred to pulmonology Dr. Rivers who he saw 3d ago, and ordered D-dimer and started on medrol dose pack. D-dimer positive yesterday, thus CTA chest ordered that showed acute B/L PE today. No recent long travel trauma or surgery. Nonsmoker 55y M Afib ablated 2016, osteoarthritis, prior PE 2017 provoked was on Eliquis for 6m (no longer on AC) sent in ED by pulmonologist for acute PE found on CTA today. Pt reports x2 weeks of dry nonproductive cough assoc w/ one week of worsening ROSAS and mild CP. Asx at rest. Denies fever, chills, recent travel, lightheadedness, fainting, palpitations, abd pain, n/v/d, urinary sx, or lower ext swelling. Was seen by PMD Dr. Blair one week ago. Referred to pulmonology Dr. Rivers who he saw 3d ago, and ordered D-dimer and started on medrol dose pack. D-dimer positive yesterday, thus CTA chest ordered that showed acute B/L PE today. No recent long travel trauma or surgery. Nonsmoker

## 2020-07-24 NOTE — H&P ADULT - ASSESSMENT
55M w/pmh  Afib s/p ablation 2016, osteoarthritis,  h/o PE (2017) previously  on Eliquis ,p/w  2 weeks , SOB and chest pain , CTA w/ b/l PE

## 2020-07-24 NOTE — ED PROVIDER NOTE - CARE PLAN
Principal Discharge DX:	Pulmonary embolism Principal Discharge DX:	Pulmonary embolism  Goal:	Bilateral segmental

## 2020-07-24 NOTE — ED ADULT NURSE NOTE - OBJECTIVE STATEMENT
Pt is a AAOx3, 55y male c/o sob for the past few days. Pt stated he was playing basketball on Sunday and started feeling more SOB and discomfort in his chest. which he went to his cardiologist for on Tuesday. He was told he had elevated D-Dimers and followed up with his pulmonologist. Pt had a CT OP and was found to have "blood clots" in his lungs, pt stated he was put on Eliquis and was told to come to the ED. Pt has hx of PE in the past and stated he is having similar s/s now. Pt stated he also a had a cough this week but was prescribed steroids and is no longer having a cough. Denies cp, palpitations, n/v, fevers, recent travel or sick contacts.

## 2020-07-25 DIAGNOSIS — D72.829 ELEVATED WHITE BLOOD CELL COUNT, UNSPECIFIED: ICD-10-CM

## 2020-07-25 DIAGNOSIS — I26.99 OTHER PULMONARY EMBOLISM WITHOUT ACUTE COR PULMONALE: ICD-10-CM

## 2020-07-25 LAB
ALBUMIN SERPL ELPH-MCNC: 3.9 G/DL — SIGNIFICANT CHANGE UP (ref 3.3–5)
ALP SERPL-CCNC: 73 U/L — SIGNIFICANT CHANGE UP (ref 40–120)
ALT FLD-CCNC: 11 U/L — SIGNIFICANT CHANGE UP (ref 10–45)
ANION GAP SERPL CALC-SCNC: 12 MMOL/L — SIGNIFICANT CHANGE UP (ref 5–17)
APTT BLD: 102.1 SEC — HIGH (ref 27.5–35.5)
APTT BLD: 104.9 SEC — HIGH (ref 27.5–35.5)
APTT BLD: 126.7 SEC — CRITICAL HIGH (ref 27.5–35.5)
APTT BLD: 82.4 SEC — HIGH (ref 27.5–35.5)
AST SERPL-CCNC: 13 U/L — SIGNIFICANT CHANGE UP (ref 10–40)
BILIRUB SERPL-MCNC: 0.3 MG/DL — SIGNIFICANT CHANGE UP (ref 0.2–1.2)
BUN SERPL-MCNC: 15 MG/DL — SIGNIFICANT CHANGE UP (ref 7–23)
CALCIUM SERPL-MCNC: 9 MG/DL — SIGNIFICANT CHANGE UP (ref 8.4–10.5)
CHLORIDE SERPL-SCNC: 104 MMOL/L — SIGNIFICANT CHANGE UP (ref 96–108)
CO2 SERPL-SCNC: 23 MMOL/L — SIGNIFICANT CHANGE UP (ref 22–31)
CREAT SERPL-MCNC: 0.83 MG/DL — SIGNIFICANT CHANGE UP (ref 0.5–1.3)
FERRITIN SERPL-MCNC: 226 NG/ML — SIGNIFICANT CHANGE UP (ref 30–400)
GLUCOSE SERPL-MCNC: 97 MG/DL — SIGNIFICANT CHANGE UP (ref 70–99)
HCT VFR BLD CALC: 43.4 % — SIGNIFICANT CHANGE UP (ref 39–50)
HGB BLD-MCNC: 13.9 G/DL — SIGNIFICANT CHANGE UP (ref 13–17)
INR BLD: 1.14 RATIO — SIGNIFICANT CHANGE UP (ref 0.88–1.16)
MCHC RBC-ENTMCNC: 27.7 PG — SIGNIFICANT CHANGE UP (ref 27–34)
MCHC RBC-ENTMCNC: 32 GM/DL — SIGNIFICANT CHANGE UP (ref 32–36)
MCV RBC AUTO: 86.5 FL — SIGNIFICANT CHANGE UP (ref 80–100)
NRBC # BLD: 0 /100 WBCS — SIGNIFICANT CHANGE UP (ref 0–0)
PLATELET # BLD AUTO: 295 K/UL — SIGNIFICANT CHANGE UP (ref 150–400)
POTASSIUM SERPL-MCNC: 4.1 MMOL/L — SIGNIFICANT CHANGE UP (ref 3.5–5.3)
POTASSIUM SERPL-SCNC: 4.1 MMOL/L — SIGNIFICANT CHANGE UP (ref 3.5–5.3)
PROT SERPL-MCNC: 6.1 G/DL — SIGNIFICANT CHANGE UP (ref 6–8.3)
PROTHROM AB SERPL-ACNC: 13.5 SEC — SIGNIFICANT CHANGE UP (ref 10.6–13.6)
RBC # BLD: 5.02 M/UL — SIGNIFICANT CHANGE UP (ref 4.2–5.8)
RBC # FLD: 13.3 % — SIGNIFICANT CHANGE UP (ref 10.3–14.5)
SARS-COV-2 RNA SPEC QL NAA+PROBE: SIGNIFICANT CHANGE UP
SODIUM SERPL-SCNC: 139 MMOL/L — SIGNIFICANT CHANGE UP (ref 135–145)
TROPONIN T, HIGH SENSITIVITY RESULT: <6 NG/L — SIGNIFICANT CHANGE UP (ref 0–51)
WBC # BLD: 11.68 K/UL — HIGH (ref 3.8–10.5)
WBC # FLD AUTO: 11.68 K/UL — HIGH (ref 3.8–10.5)

## 2020-07-25 PROCEDURE — 74177 CT ABD & PELVIS W/CONTRAST: CPT | Mod: 26

## 2020-07-25 PROCEDURE — 93306 TTE W/DOPPLER COMPLETE: CPT | Mod: 26

## 2020-07-25 PROCEDURE — 99223 1ST HOSP IP/OBS HIGH 75: CPT

## 2020-07-25 RX ORDER — ACETAMINOPHEN 500 MG
650 TABLET ORAL EVERY 4 HOURS
Refills: 0 | Status: DISCONTINUED | OUTPATIENT
Start: 2020-07-25 | End: 2020-07-26

## 2020-07-25 RX ADMIN — HEPARIN SODIUM 1100 UNIT(S)/HR: 5000 INJECTION INTRAVENOUS; SUBCUTANEOUS at 21:22

## 2020-07-25 RX ADMIN — HEPARIN SODIUM 1100 UNIT(S)/HR: 5000 INJECTION INTRAVENOUS; SUBCUTANEOUS at 14:37

## 2020-07-25 RX ADMIN — HEPARIN SODIUM 1300 UNIT(S)/HR: 5000 INJECTION INTRAVENOUS; SUBCUTANEOUS at 07:47

## 2020-07-25 NOTE — PROVIDER CONTACT NOTE (CRITICAL VALUE NOTIFICATION) - ACTION/TREATMENT ORDERED:
Nomogram followed. Explanation given to patient. Bleeding precautions maintained. Will continue to monitor.

## 2020-07-25 NOTE — PROGRESS NOTE ADULT - ASSESSMENT
55 y /o M w/pmh  Afib s/p ablation 2016, osteoarthritis,  h/o PE (2017) previously  on Eliquis ,presents /w  2 weeks , SOB and chest pain , CTA w/ b/l PE      Problem/Plan - 1:  ·  Problem: Pulmonary embolism.     no evidence of RHS on CTA ,   - continue heparin infusion  - transthoracic echocardiogram   lext dopplers     Problem/Plan - 2:  ·  Problem: Leukocytosis.  Plan: suspect 2/2 recent steroid use   - trend WBC count.     pulm/ cards eval    heme eval for hyper coag w/u  discussed w/ pt/ wife at length

## 2020-07-25 NOTE — PROGRESS NOTE ADULT - SUBJECTIVE AND OBJECTIVE BOX
CHIEF COMPLAINT:Patient is a 55y old  Male who presents with a chief complaint of Pulmonary embolus (25 Jul 2020 09:35)    	        PAST MEDICAL & SURGICAL HISTORY:  Cardiac arrhythmia, unspecified cardiac arrhythmia type  H/O knee surgery  H/O prior ablation treatment  S/P appendectomy          REVIEW OF SYSTEMS:  CONSTITUTIONAL: No fever, weight loss, or fatigue  EYES: No eye pain, visual disturbances, or discharge  NECK: No pain or stiffness  RESPIRATORY: No cough, wheezing, chills or hemoptysis; No Shortness of Breath  CARDIOVASCULAR: No chest pain, palpitations, passing out, dizziness, or leg swelling  GASTROINTESTINAL: No abdominal or epigastric pain. No nausea, vomiting, or hematemesis; No diarrhea or constipation. No melena or hematochezia.  GENITOURINARY: No dysuria, frequency, hematuria, or incontinence  NEUROLOGICAL: No headaches, memory loss, loss of strength, numbness, or tremors  SKIN: No itching, burning, rashes, or lesions   LYMPH Nodes: No enlarged glands  ENDOCRINE: No heat or cold intolerance; No hair loss  MUSCULOSKELETAL: No joint pain or swelling; No muscle, back, or extremity pain    Medications:  MEDICATIONS  (STANDING):  heparin  Infusion.  Unit(s)/Hr (15 mL/Hr) IV Continuous <Continuous>    MEDICATIONS  (PRN):  acetaminophen   Tablet .. 650 milliGRAM(s) Oral every 4 hours PRN Mild Pain (1 - 3)  heparin   Injectable 6500 Unit(s) IV Push every 6 hours PRN For aPTT less than 40  heparin   Injectable 3000 Unit(s) IV Push every 6 hours PRN For aPTT between 40 - 57    	    PHYSICAL EXAM:  T(C): 36.4 (07-25-20 @ 05:04), Max: 36.7 (07-24-20 @ 20:04)  HR: 67 (07-25-20 @ 05:04) (57 - 80)  BP: 127/74 (07-25-20 @ 05:04) (127/74 - 151/100)  RR: 18 (07-25-20 @ 05:04) (18 - 18)  SpO2: 94% (07-25-20 @ 05:04) (94% - 99%)  Wt(kg): --  I&O's Summary    25 Jul 2020 07:01  -  25 Jul 2020 11:32  --------------------------------------------------------  IN: 120 mL / OUT: 0 mL / NET: 120 mL        Appearance: Normal	  HEENT:   Normal oral mucosa, PERRL, EOMI	  Lymphatic: No lymphadenopathy  Cardiovascular: Normal S1 S2, No JVD, No murmurs, No edema  Respiratory: Lungs clear to auscultation	  Psychiatry: A & O x 3, Mood & affect appropriate  Gastrointestinal:  Soft, Non-tender, + BS	  Skin: No rashes, No ecchymoses, No cyanosis	  Neurologic: Non-focal  Extremities: Normal range of motion, No clubbing, cyanosis or edema  Vascular: Peripheral pulses palpable 2+ bilaterally    TELEMETRY: 	    ECG:  	  RADIOLOGY:  OTHER: 	  	  LABS:	 	    CARDIAC MARKERS:                                13.9   11.68 )-----------( 295      ( 25 Jul 2020 06:48 )             43.4     07-25    139  |  104  |  15  ----------------------------<  97  4.1   |  23  |  0.83    Ca    9.0      25 Jul 2020 06:51  Phos  2.4     07-24  Mg     2.0     07-24    TPro  6.1  /  Alb  3.9  /  TBili  0.3  /  DBili  x   /  AST  13  /  ALT  11  /  AlkPhos  73  07-25    proBNP:   Lipid Profile:   HgA1c:   TSH:

## 2020-07-25 NOTE — CONSULT NOTE ADULT - SUBJECTIVE AND OBJECTIVE BOX
PULMONARY CONSULT NOTE      ANANDA SONG  MRN-[id number]    Patient is a 55y old  Male who presents with a chief complaint of Pulmonary embolus (24 Jul 2020 23:50)      HISTORY OF PRESENT ILLNESS:        Allergies    No Known Allergies    Intolerances        PAST MEDICAL & SURGICAL HISTORY:  Cardiac arrhythmia, unspecified cardiac arrhythmia type  H/O knee surgery  H/O prior ablation treatment  S/P appendectomy          FAMILY HISTORY:  Family history of heart disease    Prescriptions:      SOCIAL HISTORY  Smoking History:     REVIEW OF SYSTEMS:    CONSTITUTIONAL:  No fevers, chills, sweats    HEENT:  Eyes:  No diplopia or blurred vision. ENT:  No earache, sore throat or runny nose.    CARDIOVASCULAR:  No pressure, squeezing, tightness, or heaviness about the chest; no palpitations.    RESPIRATORY:  Per HPI    GASTROINTESTINAL:  No abdominal pain, nausea, vomiting or diarrhea.    GENITOURINARY:  No dysuria, frequency or urgency.    NEUROLOGIC:  No paresthesias, fasciculations, seizures or weakness.    PSYCHIATRIC:  No disorder of thought or mood.    Vital Signs Last 24 Hrs  T(C): 36.4 (25 Jul 2020 05:04), Max: 36.7 (24 Jul 2020 20:04)  T(F): 97.5 (25 Jul 2020 05:04), Max: 98.1 (24 Jul 2020 20:04)  HR: 67 (25 Jul 2020 05:04) (57 - 80)  BP: 127/74 (25 Jul 2020 05:04) (127/74 - 151/100)  BP(mean): --  RR: 18 (25 Jul 2020 05:04) (18 - 18)  SpO2: 94% (25 Jul 2020 05:04) (94% - 99%)    PHYSICAL EXAMINATION:    GENERAL: The patient is a well-developed, well-nourished _____in no apparent distress.     HEENT: Head is normocephalic and atraumatic. Extraocular muscles are intact. Mucous membranes are moist.     NECK: Supple.     LUNGS: Clear to auscultation without wheezing, rales, or rhonchi. Respirations unlabored    HEART: Regular rate and rhythm without murmur.    ABDOMEN: Soft, nontender, and nondistended.  No hepatosplenomegaly is noted.    EXTREMITIES: Without any cyanosis, clubbing, rash, lesions or edema.    NEUROLOGIC: Grossly intact      MEDICATIONS  (STANDING):  heparin  Infusion.  Unit(s)/Hr (15 mL/Hr) IV Continuous <Continuous>      MEDICATIONS  (PRN):  acetaminophen   Tablet .. 650 milliGRAM(s) Oral every 4 hours PRN Mild Pain (1 - 3)  heparin   Injectable 6500 Unit(s) IV Push every 6 hours PRN For aPTT less than 40  heparin   Injectable 3000 Unit(s) IV Push every 6 hours PRN For aPTT between 40 - 57        LABS:   CBC Full  -  ( 25 Jul 2020 06:48 )  WBC Count : 11.68 K/uL  RBC Count : 5.02 M/uL  Hemoglobin : 13.9 g/dL  Hematocrit : 43.4 %  Platelet Count - Automated : 295 K/uL  Mean Cell Volume : 86.5 fl  Mean Cell Hemoglobin : 27.7 pg  Mean Cell Hemoglobin Concentration : 32.0 gm/dL  Auto Neutrophil # : x  Auto Lymphocyte # : x  Auto Monocyte # : x  Auto Eosinophil # : x  Auto Basophil # : x  Auto Neutrophil % : x  Auto Lymphocyte % : x  Auto Monocyte % : x  Auto Eosinophil % : x  Auto Basophil % : x    PT/INR - ( 25 Jul 2020 06:54 )   PT: 13.5 sec;   INR: 1.14 ratio         PTT - ( 25 Jul 2020 06:54 )  PTT:104.9 sec  07-25    139  |  104  |  15  ----------------------------<  97  4.1   |  23  |  0.83    Ca    9.0      25 Jul 2020 06:51  Phos  2.4     07-24  Mg     2.0     07-24    TPro  6.1  /  Alb  3.9  /  TBili  0.3  /  DBili  x   /  AST  13  /  ALT  11  /  AlkPhos  73  07-25                RADIOLOGY & ADDITIONAL STUDIES:  CT done at [facility]-  PE in right main pulm artery extending to upper and lower second order branch vessels and more periperhal in right middle and  lowerlobe  on left- emboli in left upper lobe segment and anterior left lateral lung base  ECHO:    ASSESSMENT:    PLAN:  heparin ggt  TTE  heme - lovenox?     Thank you for allowing me to participate in the care of this patient.  Please feel free to call me for any questions/concerns.      Yola Coronel DO  [facility] Pulmonary/Sleep Medicine  [phone] PULMONARY CONSULT NOTE      ANANDA SONG  MRN-[id number]    Patient is a 55y old  Male who presents with a chief complaint of Pulmonary embolus (24 Jul 2020 23:50)      HISTORY OF PRESENT ILLNESS:  56 yo male with history of PE approx. 3 eyars ago- was on Eliquis attributed to traveling also with PMH of  Afib s/p ablation 2016, osteoarthritis  admitted for outpatient findings of b/l PE  done at [facility] on 7/24  he had seen Dr Rivers in our office this week= complain of cough/ dyspnea.  dimer ordered/sent out and he restarted eliquis this week  high d-dimer resulted , sent for CTA    now on room air. comfortable. no cough. on heparin ggt  no bleeding issues  no immobility at home  does drive a lot for work  no recent travel  no history of blood clots in gian family  not established heme any longer      Allergies    No Known Allergies    Intolerances        PAST MEDICAL & SURGICAL HISTORY:  Cardiac arrhythmia, unspecified cardiac arrhythmia type  H/O knee surgery  H/O prior ablation treatment  S/P appendectomy          FAMILY HISTORY:  Family history of heart disease    Prescriptions:      SOCIAL HISTORY  Smoking History: denies    REVIEW OF SYSTEMS:    CONSTITUTIONAL:  No fevers, chills, sweats    HEENT:  Eyes:  No diplopia or blurred vision. ENT:  No earache, sore throat or runny nose.    CARDIOVASCULAR:  No pressure, squeezing, tightness, or heaviness about the chest; no palpitations.    RESPIRATORY:  Per HPI    GASTROINTESTINAL:  No abdominal pain, nausea, vomiting or diarrhea.    GENITOURINARY:  No dysuria, frequency or urgency.    NEUROLOGIC:  No paresthesias, fasciculations, seizures or weakness.    PSYCHIATRIC:  No disorder of thought or mood.    Vital Signs Last 24 Hrs  T(C): 36.4 (25 Jul 2020 05:04), Max: 36.7 (24 Jul 2020 20:04)  T(F): 97.5 (25 Jul 2020 05:04), Max: 98.1 (24 Jul 2020 20:04)  HR: 67 (25 Jul 2020 05:04) (57 - 80)  BP: 127/74 (25 Jul 2020 05:04) (127/74 - 151/100)  BP(mean): --  RR: 18 (25 Jul 2020 05:04) (18 - 18)  SpO2: 94% (25 Jul 2020 05:04) (94% - 99%)    PHYSICAL EXAMINATION:    GENERAL: The patient is a well-developed, well-nourished mlae in no apparent distress.     HEENT: Head is normocephalic and atraumatic. Extraocular muscles are intact. Mucous membranes are moist.     NECK: Supple.     LUNGS: Clear to auscultation without wheezing, rales, or rhonchi. Respirations unlabored    HEART: Regular rate and rhythm without murmur.    ABDOMEN: Soft, nontender, and nondistended.  No hepatosplenomegaly is noted.    EXTREMITIES: Without any cyanosis, clubbing, rash, lesions or edema.    NEUROLOGIC: Grossly intact      MEDICATIONS  (STANDING):  heparin  Infusion.  Unit(s)/Hr (15 mL/Hr) IV Continuous <Continuous>      MEDICATIONS  (PRN):  acetaminophen   Tablet .. 650 milliGRAM(s) Oral every 4 hours PRN Mild Pain (1 - 3)  heparin   Injectable 6500 Unit(s) IV Push every 6 hours PRN For aPTT less than 40  heparin   Injectable 3000 Unit(s) IV Push every 6 hours PRN For aPTT between 40 - 57        LABS:   CBC Full  -  ( 25 Jul 2020 06:48 )  WBC Count : 11.68 K/uL  RBC Count : 5.02 M/uL  Hemoglobin : 13.9 g/dL  Hematocrit : 43.4 %  Platelet Count - Automated : 295 K/uL  Mean Cell Volume : 86.5 fl  Mean Cell Hemoglobin : 27.7 pg  Mean Cell Hemoglobin Concentration : 32.0 gm/dL  Auto Neutrophil # : x  Auto Lymphocyte # : x  Auto Monocyte # : x  Auto Eosinophil # : x  Auto Basophil # : x  Auto Neutrophil % : x  Auto Lymphocyte % : x  Auto Monocyte % : x  Auto Eosinophil % : x  Auto Basophil % : x    PT/INR - ( 25 Jul 2020 06:54 )   PT: 13.5 sec;   INR: 1.14 ratio         PTT - ( 25 Jul 2020 06:54 )  PTT:104.9 sec  07-25    139  |  104  |  15  ----------------------------<  97  4.1   |  23  |  0.83    Ca    9.0      25 Jul 2020 06:51  Phos  2.4     07-24  Mg     2.0     07-24    TPro  6.1  /  Alb  3.9  /  TBili  0.3  /  DBili  x   /  AST  13  /  ALT  11  /  AlkPhos  73  07-25                RADIOLOGY & ADDITIONAL STUDIES:  CT done at [facility]-  PE in right main pulm artery extending to upper and lower second order branch vessels and more periperhal in right middle and  lowerlobe  on left- emboli in left upper lobe segment and anterior left lateral lung base  ECHO:    ASSESSMENT:  56 yo with PE    PLAN:  heparin ggt  TTE  US dopplers   heme consult- work for hypercoagulable state?   outpatient close f/u with Dr Rivers    Thank you for allowing me to participate in the care of this patient.  Please feel free to call me for any questions/concerns.      Yola Coronel DO  [facility] Pulmonary/Sleep Medicine  [phone]

## 2020-07-25 NOTE — PROVIDER CONTACT NOTE (CRITICAL VALUE NOTIFICATION) - ASSESSMENT
Pt sitting up at bedside, A&Ox4, no bleeding noted or reported from any site. Not in acute distress.

## 2020-07-26 ENCOUNTER — TRANSCRIPTION ENCOUNTER (OUTPATIENT)
Age: 55
End: 2020-07-26

## 2020-07-26 VITALS
RESPIRATION RATE: 18 BRPM | HEART RATE: 79 BPM | TEMPERATURE: 98 F | DIASTOLIC BLOOD PRESSURE: 65 MMHG | OXYGEN SATURATION: 96 % | SYSTOLIC BLOOD PRESSURE: 100 MMHG

## 2020-07-26 LAB
ANION GAP SERPL CALC-SCNC: 14 MMOL/L — SIGNIFICANT CHANGE UP (ref 5–17)
APTT BLD: 91.1 SEC — HIGH (ref 27.5–35.5)
BUN SERPL-MCNC: 16 MG/DL — SIGNIFICANT CHANGE UP (ref 7–23)
CALCIUM SERPL-MCNC: 8.9 MG/DL — SIGNIFICANT CHANGE UP (ref 8.4–10.5)
CHLORIDE SERPL-SCNC: 101 MMOL/L — SIGNIFICANT CHANGE UP (ref 96–108)
CO2 SERPL-SCNC: 23 MMOL/L — SIGNIFICANT CHANGE UP (ref 22–31)
CREAT SERPL-MCNC: 0.83 MG/DL — SIGNIFICANT CHANGE UP (ref 0.5–1.3)
GLUCOSE SERPL-MCNC: 77 MG/DL — SIGNIFICANT CHANGE UP (ref 70–99)
HCT VFR BLD CALC: 42.5 % — SIGNIFICANT CHANGE UP (ref 39–50)
HGB BLD-MCNC: 13.7 G/DL — SIGNIFICANT CHANGE UP (ref 13–17)
MCHC RBC-ENTMCNC: 27.8 PG — SIGNIFICANT CHANGE UP (ref 27–34)
MCHC RBC-ENTMCNC: 32.2 GM/DL — SIGNIFICANT CHANGE UP (ref 32–36)
MCV RBC AUTO: 86.2 FL — SIGNIFICANT CHANGE UP (ref 80–100)
NRBC # BLD: 0 /100 WBCS — SIGNIFICANT CHANGE UP (ref 0–0)
PLATELET # BLD AUTO: 282 K/UL — SIGNIFICANT CHANGE UP (ref 150–400)
POTASSIUM SERPL-MCNC: 4.1 MMOL/L — SIGNIFICANT CHANGE UP (ref 3.5–5.3)
POTASSIUM SERPL-SCNC: 4.1 MMOL/L — SIGNIFICANT CHANGE UP (ref 3.5–5.3)
PSA FREE FLD-MCNC: 0.61 NG/ML — SIGNIFICANT CHANGE UP
PSA FREE FLD-MCNC: 28 % — SIGNIFICANT CHANGE UP
PSA SERPL-MCNC: 2.2 NG/ML — SIGNIFICANT CHANGE UP (ref 0–4)
RBC # BLD: 4.93 M/UL — SIGNIFICANT CHANGE UP (ref 4.2–5.8)
RBC # FLD: 13.4 % — SIGNIFICANT CHANGE UP (ref 10.3–14.5)
SARS-COV-2 IGG SERPL QL IA: NEGATIVE — SIGNIFICANT CHANGE UP
SARS-COV-2 IGM SERPL IA-ACNC: 0.09 INDEX — SIGNIFICANT CHANGE UP
SODIUM SERPL-SCNC: 138 MMOL/L — SIGNIFICANT CHANGE UP (ref 135–145)
WBC # BLD: 8.22 K/UL — SIGNIFICANT CHANGE UP (ref 3.8–10.5)
WBC # FLD AUTO: 8.22 K/UL — SIGNIFICANT CHANGE UP (ref 3.8–10.5)

## 2020-07-26 PROCEDURE — 86147 CARDIOLIPIN ANTIBODY EA IG: CPT

## 2020-07-26 PROCEDURE — 84100 ASSAY OF PHOSPHORUS: CPT

## 2020-07-26 PROCEDURE — 85730 THROMBOPLASTIN TIME PARTIAL: CPT

## 2020-07-26 PROCEDURE — 99232 SBSQ HOSP IP/OBS MODERATE 35: CPT

## 2020-07-26 PROCEDURE — 80048 BASIC METABOLIC PNL TOTAL CA: CPT

## 2020-07-26 PROCEDURE — 93970 EXTREMITY STUDY: CPT | Mod: 26

## 2020-07-26 PROCEDURE — 93306 TTE W/DOPPLER COMPLETE: CPT

## 2020-07-26 PROCEDURE — 74177 CT ABD & PELVIS W/CONTRAST: CPT

## 2020-07-26 PROCEDURE — 85610 PROTHROMBIN TIME: CPT

## 2020-07-26 PROCEDURE — 84484 ASSAY OF TROPONIN QUANT: CPT

## 2020-07-26 PROCEDURE — 86769 SARS-COV-2 COVID-19 ANTIBODY: CPT

## 2020-07-26 PROCEDURE — 83735 ASSAY OF MAGNESIUM: CPT

## 2020-07-26 PROCEDURE — 82728 ASSAY OF FERRITIN: CPT

## 2020-07-26 PROCEDURE — 86146 BETA-2 GLYCOPROTEIN ANTIBODY: CPT

## 2020-07-26 PROCEDURE — 99285 EMERGENCY DEPT VISIT HI MDM: CPT | Mod: 25

## 2020-07-26 PROCEDURE — 84153 ASSAY OF PSA TOTAL: CPT

## 2020-07-26 PROCEDURE — 80053 COMPREHEN METABOLIC PANEL: CPT

## 2020-07-26 PROCEDURE — 93970 EXTREMITY STUDY: CPT

## 2020-07-26 PROCEDURE — 84154 ASSAY OF PSA FREE: CPT

## 2020-07-26 PROCEDURE — 86140 C-REACTIVE PROTEIN: CPT

## 2020-07-26 PROCEDURE — 85027 COMPLETE CBC AUTOMATED: CPT

## 2020-07-26 RX ORDER — APIXABAN 2.5 MG/1
10 TABLET, FILM COATED ORAL EVERY 12 HOURS
Refills: 0 | Status: DISCONTINUED | OUTPATIENT
Start: 2020-07-26 | End: 2020-07-26

## 2020-07-26 RX ORDER — APIXABAN 2.5 MG/1
2 TABLET, FILM COATED ORAL
Qty: 120 | Refills: 0
Start: 2020-07-26 | End: 2020-08-24

## 2020-07-26 RX ADMIN — HEPARIN SODIUM 1100 UNIT(S)/HR: 5000 INJECTION INTRAVENOUS; SUBCUTANEOUS at 04:15

## 2020-07-26 RX ADMIN — APIXABAN 10 MILLIGRAM(S): 2.5 TABLET, FILM COATED ORAL at 12:23

## 2020-07-26 NOTE — DISCHARGE NOTE PROVIDER - NSDCFUADDINST_GEN_ALL_CORE_FT
Follow up with Dr Cagle after discharge for continued monitoring and vascular consult for chronic left lower extremity vascular changes.

## 2020-07-26 NOTE — CHART NOTE - NSCHARTNOTEFT_GEN_A_CORE
ANANDA SONG    Patient requires prior authorization for Eliquis. Spoke with Ins company, medicaiton is authorized, #[id number], good for 6/26/2020 - 7/26/2021      Freida Umana

## 2020-07-26 NOTE — DISCHARGE NOTE NURSING/CASE MANAGEMENT/SOCIAL WORK - PATIENT PORTAL LINK FT
You can access the FollowMyHealth Patient Portal offered by [facility] by registering at the following website: [email or web address]. By joining [employer]’s FollowMyHealth portal, you will also be able to view your health information using other applications (apps) compatible with our system.

## 2020-07-26 NOTE — PROGRESS NOTE ADULT - SUBJECTIVE AND OBJECTIVE BOX
PULMONARY PROGRESS NOTE    ANANDA SONG  MRN-[id number]    Patient is a 55y old  Male who presents with a chief complaint of Pulmonary embolus (26 Jul 2020 08:56)      HPI:  remains on heparin ggt  comfortable  no distress    ROS:   -    ACTIVE MEDICATION LIST:  MEDICATIONS  (STANDING):  heparin  Infusion.  Unit(s)/Hr (15 mL/Hr) IV Continuous <Continuous>    MEDICATIONS  (PRN):  acetaminophen   Tablet .. 650 milliGRAM(s) Oral every 4 hours PRN Mild Pain (1 - 3)  heparin   Injectable 6500 Unit(s) IV Push every 6 hours PRN For aPTT less than 40  heparin   Injectable 3000 Unit(s) IV Push every 6 hours PRN For aPTT between 40 - 57      EXAM:  Vital Signs Last 24 Hrs  T(C): 36.4 (26 Jul 2020 04:22), Max: 36.7 (25 Jul 2020 21:39)  T(F): 97.5 (26 Jul 2020 04:22), Max: 98 (25 Jul 2020 21:39)  HR: 66 (26 Jul 2020 04:22) (66 - 67)  BP: 103/65 (26 Jul 2020 04:22) (103/65 - 121/74)  BP(mean): --  RR: 18 (26 Jul 2020 04:22) (18 - 18)  SpO2: 96% (26 Jul 2020 04:22) (96% - 100%)    GENERAL: The patient is awake and alert in no apparent distress.     LUNGS: Clear to auscultation without wheezing, rales or rhonchi; respirations unlabored    HEART: Regular rate and rhythm without murmur.                            13.7   8.22  )-----------( 282      ( 26 Jul 2020 05:42 )             42.5       07-26    138  |  101  |  16  ----------------------------<  77  4.1   |  23  |  0.83    Ca    8.9      26 Jul 2020 05:42  Phos  2.4     07-24  Mg     2.0     07-24    TPro  6.1  /  Alb  3.9  /  TBili  0.3  /  DBili  x   /  AST  13  /  ALT  11  /  AlkPhos  73  07-25     CT donee at [facility]    PROBLEM LIST:  55y Male with HEALTH ISSUES - PROBLEM Dx:  Leukocytosis: Leukocytosis  Pulmonary embolism: Pulmonary embolism  Suspected deep vein thrombosis (DVT)  Pulmonary thromboembolism       RECS:  checK US dopplers  agree with Eliquis upon discharge  if dopplers negative- no contraindication to discharge on pulm perspective  needs close f/u with Dr Rivers      Please call with any questions.    Yola Coronel DO  [facility]  [phone]

## 2020-07-26 NOTE — DISCHARGE NOTE PROVIDER - NSDCCPCAREPLAN_GEN_ALL_CORE_FT
PRINCIPAL DISCHARGE DIAGNOSIS  Diagnosis: Pulmonary embolism  Assessment and Plan of Treatment: Take your anticoagulation Elquis as directed.  Follow up with your health care provider within one week. Call for appointment.  If you develop shortness of breath or if your shortness of breath worsens call your Health Care Provider or go to the Emergency Department.

## 2020-07-26 NOTE — DISCHARGE NOTE PROVIDER - HOSPITAL COURSE
56 yo male with PMH Afib s/p ablation, pulm embolism (2017) after long flight, osteoarthritis, basal cell on face admitted presents for pulmonary embolus found on outpatient CTA as part of work up for ongoing chest pain and dyspnea on exertion over the past week. PE etiology is unclear? -  Patient seen by cardiology, pulm, and heme. CT A/P r/o malignancy - hypodensities within the liver, will f/u with Heme and out patient MRI. Will also f/u with Heme for APLS studies. Patient treated with heparin gtt and transition to Eliquis upon discharge. LE dopplers  - no DVT right LE, Chronic change is seen within the left popliteal vein and left tibioperoneal trunk. Patient will f/u with Dr Wakefield and will be referred to vascular Sx for further out patient w/u.    DCP and medication reconciliation discussed with Dr Gutierrez and in agreement. Patient is hemodynamically stable and cleared for discharge. Patient will f/u with Dr Cagle, Dr Monroy, and Dr Blair after discharge.

## 2020-07-26 NOTE — DISCHARGE NOTE PROVIDER - PROVIDER RX CONTACT NUMBER
[FreeTextEntry1] : 8/30/19- SNA\par Ms. Corrie Patterson presents today for consideration for radiation therapy for RIGHT breast cancer-- [id number] (AJCC IIIA) poorly differentiated ductal carcinoma. \par \par Bone scan done on 3/25/19 showed no evidence of metastatic disease. It showed degenerative disease in the lumbar region\par \par \par When she was initially seen on 3/30/18, she agreed and consented to a course of post-mastectomy radiotherapy. Plan was to coordinate her systemic therapy plans with Dr. Pena and discuss expansion plans with Dr. Costa. In the interim, she has notably been a no show to several [facility] appointments and she missed her last appointment here scheduled for 4/5/19. She last saw Dr. Costa on 12/6/18, and plan at the time was to complete breast reconstruction and contralateral symmetry procedure; to be done following RT.\par \par She saw cardiologist Dr. Cruz on 5/10/18 and he informed her the benefit of biv pacing in patients with normal QRS duration in reducing MR is low. He advised that she stay on maximal medical therapy and have her EF re-evaluated in 3 months, and if EF fails to improve, she may benefit from a single chamber, primary prevention ICD. Of note, she was hospitalized at [facility] for 5 days in mid February 2019 for acute systolic HF secondary to non-complianace. She saw cardiologist Dr. Ding on 3/5/19, at which time medications were adjusted and she was urged to follow up here. Dr. Ding also planned to defer ICD at this time with EF > 35% and unknown status of further surgeries. She last saw Dr. Dominguez on 6/11/19; plan was for repeat echo and follow up with EP for possible ICD placement. \par \par She last saw Dr. Pena on ___ \par Has she followed up with Dr. Jean??\par \par Today, she XXX\par \par \par ________________________________\par ONCOLOGIC HISTORY (3/30/18)\par \par Ms. Corrie Patterson presents today for consideration for radiation therapy for breast cancer. \par \par Ms. Patterson had calcifications seen on a mammogram in December 2016 in the right lower inner quadrant. She initially felt a lump in her breast in the Spring of 2017. Mammogram on 3/23/17 showed right breast calcifications, biopsy recommended. She underwent biopsy of the right breast, lower inner calcifications on 5/30/17. Pathology revealed invasive ductal carcinoma, poorly differentiated, spanning 8mm in this materia. There was additionally DCIS, solid and papillary types with high nuclear grade, marked necrosis and calcifications. \par \par She saw Dr. Jean in July 2017. \par \par She underwent an MRI on 8/23/17 which revealed large elongated heterogeneous mass int he lower inner quadrant of the right breast consistent with the patient's known DCIS, with a 1.5 cm satellite lesion more anteriorly toward the nipple. Consider focused preoperative ultrasound or preoperative ZELDA localization to include the complete mass. Left breast MRI was unremarkable. There was mild bilateral axillary lymphadenopathy, left greater than right, possibly reactive. \par \par She underwent additional biopsy of right breast, 4:00 area, 2 cm FN on 9/18/17. This revealed poorly differentiated invasive duct carcinoma. She missed multiple appointments for this biopsy. \par \par She saw Dr. Pena in August 2017. It was determined that she would have chemotherapy. She received 4 cycles of chemotherapy from Nov. -Dec. 2017\par \par She underwent right mastectomy on 1/25/18. Results revealed poorly differentiated invasive duct carcinoma.. 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 There was duct ectasia. Tumor size was 5.5 cm. 24 total lymph nodes were examined, 6 of them sentinel nodes. All were negative for tumor. Margins negative. ER/NY negative. HER2 negative\par She had tissue expanders placed on 1/25/18 by Dr. Costa and is currently undergoing expansion of the right breast.\par \par Today she felt slightly nauseated and dizzy earlier but now resolved. Saw Dr. Costa. yesterday and had right breast injection for breast expansion. \par \par \par 
[phone]

## 2020-07-26 NOTE — PROGRESS NOTE ADULT - SUBJECTIVE AND OBJECTIVE BOX
CHIEF COMPLAINT:Patient is a 55y old  Male who presents with a chief complaint of Pulmonary embolus (26 Jul 2020 10:40)    	        PAST MEDICAL & SURGICAL HISTORY:  Cardiac arrhythmia, unspecified cardiac arrhythmia type  H/O knee surgery  H/O prior ablation treatment  S/P appendectomy          REVIEW OF SYSTEMS:  CONSTITUTIONAL: No fever, weight loss, or fatigue  EYES: No eye pain, visual disturbances, or discharge  NECK: No pain or stiffness  RESPIRATORY: No cough, wheezing, chills or hemoptysis; No Shortness of Breath  CARDIOVASCULAR: No chest pain, palpitations, passing out, dizziness, or leg swelling  GASTROINTESTINAL: No abdominal or epigastric pain. No nausea, vomiting, or hematemesis; No diarrhea or constipation. No melena or hematochezia.  GENITOURINARY: No dysuria, frequency, hematuria, or incontinence  NEUROLOGICAL: No headaches, memory loss, loss of strength, numbness, or tremors    MUSCULOSKELETAL: No joint pain or swelling; No muscle, back, or extremity pain    Medications:  MEDICATIONS  (STANDING):  heparin  Infusion.  Unit(s)/Hr (15 mL/Hr) IV Continuous <Continuous>    MEDICATIONS  (PRN):  acetaminophen   Tablet .. 650 milliGRAM(s) Oral every 4 hours PRN Mild Pain (1 - 3)  heparin   Injectable 6500 Unit(s) IV Push every 6 hours PRN For aPTT less than 40  heparin   Injectable 3000 Unit(s) IV Push every 6 hours PRN For aPTT between 40 - 57    	    PHYSICAL EXAM:  T(C): 36.7 (07-26-20 @ 11:25), Max: 36.7 (07-25-20 @ 21:39)  HR: 79 (07-26-20 @ 11:25) (66 - 79)  BP: 100/65 (07-26-20 @ 11:25) (100/65 - 121/74)  RR: 18 (07-26-20 @ 11:25) (18 - 18)  SpO2: 96% (07-26-20 @ 11:25) (96% - 100%)  Wt(kg): --  I&O's Summary    25 Jul 2020 07:01  -  26 Jul 2020 07:00  --------------------------------------------------------  IN: 865 mL / OUT: 0 mL / NET: 865 mL    26 Jul 2020 07:01  -  26 Jul 2020 11:40  --------------------------------------------------------  IN: 220 mL / OUT: 0 mL / NET: 220 mL        Appearance: Normal	  HEENT:   Normal oral mucosa, PERRL, EOMI	  Lymphatic: No lymphadenopathy  Cardiovascular: Normal S1 S2, No JVD, No murmurs, No edema  Respiratory: Lungs clear to auscultation	  Psychiatry: A & O x 3, Mood & affect appropriate  Gastrointestinal:  Soft, Non-tender, + BS	  Skin: No rashes, No ecchymoses, No cyanosis	  Neurologic: Non-focal  Extremities: Normal range of motion, No clubbing, cyanosis or edema  Vascular: Peripheral pulses palpable 2+ bilaterally    TELEMETRY: 	    ECG:  	  RADIOLOGY:  OTHER: 	  	  LABS:	 	    CARDIAC MARKERS:                                13.7   8.22  )-----------( 282      ( 26 Jul 2020 05:42 )             42.5     07-26    138  |  101  |  16  ----------------------------<  77  4.1   |  23  |  0.83    Ca    8.9      26 Jul 2020 05:42  Phos  2.4     07-24  Mg     2.0     07-24    TPro  6.1  /  Alb  3.9  /  TBili  0.3  /  DBili  x   /  AST  13  /  ALT  11  /  AlkPhos  73  07-25    proBNP:   Lipid Profile:   HgA1c:   TSH:

## 2020-07-26 NOTE — CONSULT NOTE ADULT - ASSESSMENT
56 yo male with PMH Afib s/p ablation, pulm embolism, osteoarthritis admitted with new unprovoked bilateral PE      - agree with heparin drip  - will send off APLS studies, but ok to switch to Eliquis for now  - will send remainder of hyper coag workup as outpatient, though either way this patient would need lifelong anticoagulation as this is his second episode of PE  - LE dopplers ordered  - routine malignancy screening      - after LE dopplers, if stable, and ok with other specialties, ok to DC home with outpatient follow up from heme perspective 56 yo male with PMH Afib s/p ablation, pulm embolism (2017) after long flight, osteoarthritis, basal cell on face admitted with new unprovoked bilateral PE    - agree with heparin drip  - will send off APLS studies, but ok to switch to Eliquis for now, if positive will need to switch to coumadin  - will send remainder of hyper coag workup as outpatient, though either way this patient would need lifelong anticoagulation as this is his second episode of PE  - LE dopplers ordered  - routine malignancy screening - up to date - had colonoscopy  - CT A/P done and will fu results  - will check PSA      - after LE dopplers done and CT A/P reported, if stable, and ok with other specialties, ok to DC home with outpatient follow up from heme perspective  FU with me in 2-3 weeks - patient has my contact info. 56 yo male with PMH Afib s/p ablation, pulm embolism (2017) after long flight, osteoarthritis, basal cell on face admitted with new unprovoked bilateral PE    - agree with heparin drip  - will send off APLS studies, but ok to switch to Eliquis for now, if positive will need to switch to coumadin  - will send remainder of hyper coag workup as outpatient, though either way this patient would need lifelong anticoagulation as this is his second episode of PE  - LE dopplers ordered  - routine malignancy screening - up to date - had colonoscopy  - CT A/P done and will fu results  - will check PSA      - after LE dopplers done and CT A/P reported, if stable, and ok with other specialties, ok to DC home with outpatient follow up from heme perspective  FU with me in 2-3 weeks - patient has my contact info.    Addendum:  CT A/P resulted - notes hypodensities in liver that are too small to characterize.  I will consider MRI liver to better clarify but can be done as outpatient.    D/W NP

## 2020-07-26 NOTE — PHARMACOTHERAPY INTERVENTION NOTE - COMMENTS
Counseled patient about indication, directions, and side effect profile of new anticoagulant, Eliquis. Provided patient with 1 month free trial card and $10 co-pay coupon for Eliquis.     Coretta Hooker, PharmD  PGY-1 Pharmacy Resident  [phone]

## 2020-07-26 NOTE — CONSULT NOTE ADULT - SUBJECTIVE AND OBJECTIVE BOX
patient well known to me  HPI:  55 year old   male w/pmh  Afib s/p ablation 2016, osteoarthritis,  h/o PE (2017) previously on  on Eliquis , presents for  pulmonary embolus found on outpatient CTA as part of work up for ongoing chest pain and dyspnea on exertion over the past  week.   Patient reports first feeling unwell about two weeks prior to admission, he reports increased fatigued as well as a nonproductive cough with deep breathing. Patient was evaluated at an urgent care at the time and CXR was noted to be clear. He was treated with Claritin and albuterol inhaler. However, his symptoms persisted and for the past week patient reports worsening dyspnea with exertion as well midsternal chest pressure. He reports being seen by his PMD  and patient was started on a course of steroids for possible bronchitis. Patient was referred to pulmonology who recommended restarting Eliquis and obtain a ddimer which retuned elevated. On day of admission patient obtained a CT chest which showed bilateral pulmonary emboli , and patient was subsequently referred to the hospital. Patient reports a history of PE after a flight to [facility] about 3 years prior to admission , he was treated with Eliquis at that time. Patient reports no fever or chills no recent travel.    Covid negative   2Decho normal LV and RV function cannot asses pulmonary pressure   MEDICATIONS:  MEDICATIONS  (STANDING):  heparin  Infusion.  Unit(s)/Hr (15 mL/Hr) IV Continuous <Continuous>      PHYSICAL EXAM:  T(C): 36.4 (07-26-20 @ 04:22), Max: 36.7 (07-25-20 @ 21:39)  HR: 66 (07-26-20 @ 04:22) (66 - 67)  BP: 103/65 (07-26-20 @ 04:22) (103/65 - 121/74)  RR: 18 (07-26-20 @ 04:22) (18 - 18)  SpO2: 96% (07-26-20 @ 04:22) (96% - 100%)  Wt(kg): --  I&O's Summary    25 Jul 2020 07:01  -  26 Jul 2020 07:00  --------------------------------------------------------  IN: 865 mL / OUT: 0 mL / NET: 865 mL          Appearance: Normal feels well 	  HEENT:   Normal oral mucosa, PERRL, EOMI	  Cardiovascular: Normal S1 S2, No JVD, No murmurs ,  Respiratory: Lungs clear to auscultation, normal effort 	  Ext: No edema  Peripheral pulses palpable 2+ bilaterally      LABS:    CARDIAC MARKERS:                                13.7   8.22  )-----------( 282      ( 26 Jul 2020 05:42 )             42.5     07-26    138  |  101  |  16  ----------------------------<  77  4.1   |  23  |  0.83    Ca    8.9      26 Jul 2020 05:42  Phos  2.4     07-24  Mg     2.0     07-24    TPro  6.1  /  Alb  3.9  /  TBili  0.3  /  DBili  x   /  AST  13  /  ALT  11  /  AlkPhos  73  07-25    COVID-19 IgG Antibody Index: 0.09: Roche ECLIA Total AB (AMANDA)  NOTE: This result index represents a total antibody measurement,  which  includes IgG, IgA, and IgM  Negative <= 0.99 Index  Positive >= 1.00 Index Index (07.25.20 @ 23:33)    proBNP:   Lipid Profile:   HgA1c:   TSH:     COVID-19  Antibody - for prior infection screening (07.25.20 @ 23:33)    COVID-19 IgG Antibody Index: 0.09: Roche ECLIA Total AB (AMANDA)  NOTE: This result index represents a total antibody measurement,  which  includes IgG, IgA, and IgM  Negative <= 0.99 Index  Positive >= 1.00 Index Index      COVID-19 PCR . (07.24.20 @ 22:06)    COVID-19 PCR: NotDetec: This test has been validated by [employer] to be accurate;  though it has not been FDA cleared/approved by the usual pathway.  As with all laboratory tests, results should be correlated with clinical  findings.  https://www.fda.gov/media/295973/download  https://www.fda.gov/media/001517/download            TELEMETRY: 	    ECG:  NSR WNL	  RADIOLOGY:     < from: Transthoracic Echocardiogram (07.25.20 @ 14:37) >  Conclusions:  1. Normal mitral valve. Mild mitral regurgitation.  2. Mildly dilated left atrium.  LA volume index = 35 cc/m2.  3. Normal left ventricular internal dimensions and wall  thicknesses.  4. Normal left ventricular systolic function. No segmental  wall motion abnormalities.  5. Normal right ventricular size and function.  6. Inadequate tricuspid regurgitation Doppler envelope  precludes estimation of RVSP.  ------------------------------------------------------------------------  Confirmed on  7/25/2020 - 16:41:21 by Robert Fleming M.D.

## 2020-07-26 NOTE — CONSULT NOTE ADULT - SUBJECTIVE AND OBJECTIVE BOX
Chief Complaint:  Chest pain / SOB / cough    HPI:  55 year old   male w/pmh  Afib s/p ablation 2016, osteoarthritis,  h/o PE (2017) previously on  on Eliquis , presents for  pulmonary embolus found on outpatient CTA as part of work up for ongoing chest pain and dyspnea on exertion over the past  week.   Patient reports first feeling unwell about two weeks prior to admission, he reports increased fatigued as well as a nonproductive cough with deep breathing. Patient was evaluated at an urgent care at the time and CXR was noted to be clear. He was treated with Claritin and albuterol inhaler. However, his symptoms persisted and for the past week patient reports worsening dyspnea with exertion as well midsternal chest pressure. He reports being seen by his PMD  and patient was started on a course of steroids for possible bronchitis. Patient was referred to pulmonology who recommended restarting Eliquis and obtain a ddimer which retuned elevated. On day of admission patient obtained a CT chest which showed bilateral pulmonary emboli , and patient was subsequently referred to the hospital. Patient reports a history of PE after a flight to [facility] about 3 years prior to admission , he was treated with Eliquis at that time. Patient reports no fever or chills no recent travel. (24 Jul 2020 23:50)         PAST MEDICAL & SURGICAL HISTORY:  Cardiac arrhythmia, unspecified cardiac arrhythmia type  H/O knee surgery  H/O prior ablation treatment  S/P appendectomy      SOCIAL HISTORY:  Smoking - Non smoker   Alcohol - Social  Drugs - No drug use    FAMILY HISTORY:  Family history of heart disease      MEDICATIONS  (STANDING):  heparin  Infusion.  Unit(s)/Hr (15 mL/Hr) IV Continuous <Continuous>    MEDICATIONS  (PRN):  acetaminophen   Tablet .. 650 milliGRAM(s) Oral every 4 hours PRN Mild Pain (1 - 3)  heparin   Injectable 6500 Unit(s) IV Push every 6 hours PRN For aPTT less than 40  heparin   Injectable 3000 Unit(s) IV Push every 6 hours PRN For aPTT between 40 - 57      Allergies    No Known Allergies    Intolerances        Vital Signs Last 24 Hrs  T(C): 36.4 (26 Jul 2020 04:22), Max: 36.7 (25 Jul 2020 21:39)  T(F): 97.5 (26 Jul 2020 04:22), Max: 98 (25 Jul 2020 21:39)  HR: 66 (26 Jul 2020 04:22) (66 - 67)  BP: 103/65 (26 Jul 2020 04:22) (103/65 - 121/74)  BP(mean): --  RR: 18 (26 Jul 2020 04:22) (18 - 18)  SpO2: 96% (26 Jul 2020 04:22) (96% - 100%)    PHYSICAL EXAM  General: NAD  HEENT: clear oropharynx, anicteric sclera, pink conjunctiva  Neck: supple  CV: normal S1/S2 with no murmur rubs or gallops  Lungs: clear to auscultation, no wheezes, no rales  Abdomen: soft non-tender non-distended, no hepatosplenomegaly, positive bowel sounds  Ext: no clubbing cyanosis or edema  Skin: no rashes and no petechiae  Lymph Nodes: No LAD in axillae, groin, neck  Neuro: alert and oriented X 3, no focal deficits    LABS:                          13.7   8.22  )-----------( 282      ( 26 Jul 2020 05:42 )             42.5         Mean Cell Volume : 86.2 fl  Mean Cell Hemoglobin : 27.8 pg  Mean Cell Hemoglobin Concentration : 32.2 gm/dL  Auto Neutrophil # : x  Auto Lymphocyte # : x  Auto Monocyte # : x  Auto Eosinophil # : x  Auto Basophil # : x  Auto Neutrophil % : x  Auto Lymphocyte % : x  Auto Monocyte % : x  Auto Eosinophil % : x  Auto Basophil % : x      Serial CBC's  07-26 @ 05:42  Hct-42.5 / Hgb-13.7 / Plat-282 / RBC-4.93 / WBC-8.22  Serial CBC's  07-25 @ 06:48  Hct-43.4 / Hgb-13.9 / Plat-295 / RBC-5.02 / WBC-11.68  Serial CBC's  07-24 @ 20:56  Hct-44.7 / Hgb-14.0 / Plat-311 / RBC-5.15 / WBC-11.03      07-26    138  |  101  |  16  ----------------------------<  77  4.1   |  23  |  0.83    Ca    8.9      26 Jul 2020 05:42  Phos  2.4     07-24  Mg     2.0     07-24    TPro  6.1  /  Alb  3.9  /  TBili  0.3  /  DBili  x   /  AST  13  /  ALT  11  /  AlkPhos  73  07-25      PT/INR - ( 25 Jul 2020 06:54 )   PT: 13.5 sec;   INR: 1.14 ratio         PTT - ( 26 Jul 2020 03:28 )  PTT:91.1 sec    Ferritin, Serum: 226 ng/mL (07-25 @ 01:08)    outside CT angiogram (as per pulm note):  [facility] done at [facility]-  PE in right main pulm artery extending to upper and lower second order branch vessels and more periperhal in right middle and  lowerlobe  on left- emboli in left upper lobe segment and anterior left lateral lung base    < from: Transthoracic Echocardiogram (07.25.20 @ 14:37) >  1. Normal mitral valve. Mild mitral regurgitation.  2. Mildly dilated left atrium.  LA volume index = 35 cc/m2.  3. Normal left ventricular internal dimensions and wall  thicknesses.  4. Normal left ventricular systolic function. No segmental  wall motion abnormalities.  5. Normal right ventricular size and function.  6. Inadequate tricuspid regurgitation Doppler envelope  precludes estimation of RVSP.    < end of copied text > Chief Complaint:  Chest pain / SOB / cough    HPI:  55 year old   male w/pmh  Afib s/p ablation 2016, osteoarthritis,  h/o PE (2017) previously on  on Eliquis , presents for  pulmonary embolus found on outpatient CTA as part of work up for ongoing chest pain and dyspnea on exertion over the past  week.   Patient reports first feeling unwell about two weeks prior to admission, he reports increased fatigued as well as a nonproductive cough with deep breathing. Patient was evaluated at an urgent care at the time and CXR was noted to be clear. He was treated with Claritin and albuterol inhaler. However, his symptoms persisted and for the past week patient reports worsening dyspnea with exertion as well midsternal chest pressure. He reports being seen by his PMD  and patient was started on a course of steroids for possible bronchitis. Patient was referred to pulmonology who recommended restarting Eliquis and obtain a ddimer which retuned elevated. On day of admission patient obtained a CT chest which showed bilateral pulmonary emboli , and patient was subsequently referred to the hospital. Patient reports a history of PE after a flight to [facility] about 3 years prior to admission , he was treated with Eliquis at that time. Patient reports no fever or chills no recent travel. (24 Jul 2020 23:50)    pt seen today with wife on the phone during interview.  Feeling better with less CP and SOB. Denies bleeding.   Reports that he was on eliquis for about 6 months. He was seen by Dr. Charbel Huerta from Hematology in 2017 and believes hypercoag workup was done. He had recent colonscopy a few months ago by Dr. celia Joseph that was clear.    ROS: no wt loss, night sweats, change in appetite  denies HA, has chronic vision issues, no sore throat, F/C  Denies N/V/D/C, denies blood in the stool  Denies hematuria, dysuria  no focal weakness, numbness, tingling, tremors    PAST MEDICAL & SURGICAL HISTORY:  Cardiac arrhythmia, unspecified cardiac arrhythmia type  H/O knee surgery  H/O prior ablation treatment  S/P appendectomy      SOCIAL HISTORY:  Smoking - Non smoker   Alcohol - Social  Drugs - No drug use    FAMILY HISTORY:  Family history of heart disease      MEDICATIONS  (STANDING):  heparin  Infusion.  Unit(s)/Hr (15 mL/Hr) IV Continuous <Continuous>    MEDICATIONS  (PRN):  acetaminophen   Tablet .. 650 milliGRAM(s) Oral every 4 hours PRN Mild Pain (1 - 3)  heparin   Injectable 6500 Unit(s) IV Push every 6 hours PRN For aPTT less than 40  heparin   Injectable 3000 Unit(s) IV Push every 6 hours PRN For aPTT between 40 - 57      Allergies    No Known Allergies    Intolerances        Vital Signs Last 24 Hrs  T(C): 36.4 (26 Jul 2020 04:22), Max: 36.7 (25 Jul 2020 21:39)  T(F): 97.5 (26 Jul 2020 04:22), Max: 98 (25 Jul 2020 21:39)  HR: 66 (26 Jul 2020 04:22) (66 - 67)  BP: 103/65 (26 Jul 2020 04:22) (103/65 - 121/74)  BP(mean): --  RR: 18 (26 Jul 2020 04:22) (18 - 18)  SpO2: 96% (26 Jul 2020 04:22) (96% - 100%)    PHYSICAL EXAM  General: NAD  HEENT: clear oropharynx, anicteric sclera, pink conjunctiva  Neck: supple  CV: normal S1/S2 with no murmur rubs or gallops  Lungs: clear to auscultation, no wheezes, no rales  Abdomen: soft non-tender non-distended, no hepatosplenomegaly, positive bowel sounds  Ext: no clubbing cyanosis or edema  Skin: no rashes and no petechiae  Lymph Nodes: No LAD in axillae, neck  Neuro: alert and oriented X 3, no focal deficits    LABS:                          13.7   8.22  )-----------( 282      ( 26 Jul 2020 05:42 )             42.5         Mean Cell Volume : 86.2 fl  Mean Cell Hemoglobin : 27.8 pg  Mean Cell Hemoglobin Concentration : 32.2 gm/dL  Auto Neutrophil # : x  Auto Lymphocyte # : x  Auto Monocyte # : x  Auto Eosinophil # : x  Auto Basophil # : x  Auto Neutrophil % : x  Auto Lymphocyte % : x  Auto Monocyte % : x  Auto Eosinophil % : x  Auto Basophil % : x      Serial CBC's  07-26 @ 05:42  Hct-42.5 / Hgb-13.7 / Plat-282 / RBC-4.93 / WBC-8.22  Serial CBC's  07-25 @ 06:48  Hct-43.4 / Hgb-13.9 / Plat-295 / RBC-5.02 / WBC-11.68  Serial CBC's  07-24 @ 20:56  Hct-44.7 / Hgb-14.0 / Plat-311 / RBC-5.15 / WBC-11.03      07-26    138  |  101  |  16  ----------------------------<  77  4.1   |  23  |  0.83    Ca    8.9      26 Jul 2020 05:42  Phos  2.4     07-24  Mg     2.0     07-24    TPro  6.1  /  Alb  3.9  /  TBili  0.3  /  DBili  x   /  AST  13  /  ALT  11  /  AlkPhos  73  07-25      PT/INR - ( 25 Jul 2020 06:54 )   PT: 13.5 sec;   INR: 1.14 ratio         PTT - ( 26 Jul 2020 03:28 )  PTT:91.1 sec    Ferritin, Serum: 226 ng/mL (07-25 @ 01:08)    outside CT angiogram (as per pulm note):  CT done at [facility]-  PE in right main pulm artery extending to upper and lower second order branch vessels and more periperhal in right middle and  lowerlobe  on left- emboli in left upper lobe segment and anterior left lateral lung base    < from: Transthoracic Echocardiogram (07.25.20 @ 14:37) >  1. Normal mitral valve. Mild mitral regurgitation.  2. Mildly dilated left atrium.  LA volume index = 35 cc/m2.  3. Normal left ventricular internal dimensions and wall  thicknesses.  4. Normal left ventricular systolic function. No segmental  wall motion abnormalities.  5. Normal right ventricular size and function.  6. Inadequate tricuspid regurgitation Doppler envelope  precludes estimation of RVSP.    < end of copied text >

## 2020-07-26 NOTE — CONSULT NOTE ADULT - ASSESSMENT
1. acute pulmonary embolus- etiology unclear ?hypercoagulable state  2. hemodynamically stable  3. no  evidence of right heart strain    Recommend  doppler LE  hypercoagulable workup can be done as outpatient  CT abdomen R/o carcinoma  ?switch to eliquis and discharge home next 24 hours

## 2020-07-26 NOTE — DISCHARGE NOTE PROVIDER - NSDCMRMEDTOKEN_GEN_ALL_CORE_FT
apixaban 5 mg oral tablet: 2 tab(s) orally every 12 hours apixaban 5 mg oral tablet: 2 tab(s) orally every 12 hours for 7 days, then 1 tab Q12h

## 2020-07-26 NOTE — DISCHARGE NOTE PROVIDER - PROVIDER TOKENS
PROVIDER:[TOKEN:[[id number]],FOLLOWUP:[1 week],ESTABLISHEDPATIENT:[T]],PROVIDER:[TOKEN:[[id number]],FOLLOWUP:[1 week],ESTABLISHEDPATIENT:[T]],PROVIDER:[TOKEN:[[id number]],FOLLOWUP:[2 weeks]]

## 2020-07-26 NOTE — DISCHARGE NOTE PROVIDER - CARE PROVIDER_API CALL
Ismael Blair  CARDIOVASCULAR DISEASE  [address] [place] [address]  Phone: [phone]  Fax: [phone]  Established Patient  Follow Up Time: 1 week    Vicente Wakefield  CARDIOVASCULAR DISEASE  [address]  [place] [address]  Phone: [phone]  Fax: [phone]  Established Patient  Follow Up Time: 1 week    Michi Monroy  HOSPICE/PALLIATIVE MEDICINE  [address]  [facility] [address]  [place] [address]  Phone: [phone]  Fax: [phone]  Follow Up Time: 2 weeks

## 2020-07-26 NOTE — PROGRESS NOTE ADULT - ASSESSMENT
55 y /o M w/pmh  Afib s/p ablation 2016, osteoarthritis,  h/o PE (2017) previously  on Eliquis ,presents /w  2 weeks , SOB and chest pain , CTA w/ b/l PE      Problem/Plan - 1:  ·  Problem: Pulmonary embolism.     no evidence of RHS on CTA ,   change to po eliquis   lext dopplers     Problem/Plan - 2:  ·  Problem: Leukocytosis.  Plan: suspect 2/2 recent steroid use   - trend WBC count.     pulm/ cards eval    heme eval for hyper coag w/u noted  discussed w/ pt/  d/c planning

## 2020-07-27 LAB
DRVVT SCREEN TO CONFIRM RATIO: SIGNIFICANT CHANGE UP
LA NT DPL PPP QL: 31 SEC — SIGNIFICANT CHANGE UP
NORMALIZED SCT PPP-RTO: 0.83 RATIO — SIGNIFICANT CHANGE UP (ref 0–1.16)
NORMALIZED SCT PPP-RTO: SIGNIFICANT CHANGE UP

## 2020-07-28 ENCOUNTER — APPOINTMENT (OUTPATIENT)
Dept: PULMONOLOGY | Facility: CLINIC | Age: 55
End: 2020-07-28
Payer: COMMERCIAL

## 2020-07-28 VITALS
TEMPERATURE: 98.3 F | OXYGEN SATURATION: 94 % | SYSTOLIC BLOOD PRESSURE: 112 MMHG | DIASTOLIC BLOOD PRESSURE: 77 MMHG | HEART RATE: 78 BPM | RESPIRATION RATE: 16 BRPM

## 2020-07-28 LAB
B2 GLYCOPROT1 AB SER QL: NEGATIVE — SIGNIFICANT CHANGE UP
CARDIOLIPIN AB SER-ACNC: NEGATIVE — SIGNIFICANT CHANGE UP

## 2020-07-28 PROCEDURE — 99214 OFFICE O/P EST MOD 30 MIN: CPT

## 2020-07-29 NOTE — PHYSICAL EXAM
[No Acute Distress] : no acute distress [Normal Oropharynx] : normal oropharynx [Normal Appearance] : normal appearance [Normal S1, S2] : normal s1, s2 [No Murmurs] : no murmurs [Clear to Auscultation Bilaterally] : clear to auscultation bilaterally [Benign] : benign [Normal to Percussion] : normal to percussion [TextBox_105] : trace edema left

## 2020-07-29 NOTE — ASSESSMENT
[FreeTextEntry1] : Continue Eliquis\par Hematology evaluation\par Will need repeat CTA in few months.\par For vascular evaluation.

## 2020-07-29 NOTE — DISCUSSION/SUMMARY
[FreeTextEntry1] : DVT.  Recurrent.\par Pulmonary embolic disease with significant clot burden.  Will need follow-up CTA.\par Unprovoked.\par Had prior pulmonary embolus.  That episode was associated with a trip to [place].\par States had hematology evaluation after prior PE.\par Patient doing well clinically.\par Cough resolved.

## 2020-07-29 NOTE — HISTORY OF PRESENT ILLNESS
[TextBox_4] : S/P hosp for DVT/PE\par Feeling well\par Decrease in SOB\par Feeling relatively well.\par No significant chest discomfort.\par

## 2020-11-30 ENCOUNTER — APPOINTMENT (OUTPATIENT)
Dept: PULMONOLOGY | Facility: CLINIC | Age: 55
End: 2020-11-30
Payer: COMMERCIAL

## 2020-11-30 VITALS
BODY MASS INDEX: 25.2 KG/M2 | OXYGEN SATURATION: 64 % | SYSTOLIC BLOOD PRESSURE: 121 MMHG | RESPIRATION RATE: 15 BRPM | HEART RATE: 80 BPM | HEIGHT: 71 IN | WEIGHT: 180 LBS | DIASTOLIC BLOOD PRESSURE: 83 MMHG | TEMPERATURE: 97.7 F

## 2020-11-30 PROCEDURE — 99213 OFFICE O/P EST LOW 20 MIN: CPT

## 2020-11-30 PROCEDURE — 99072 ADDL SUPL MATRL&STAF TM PHE: CPT

## 2020-11-30 RX ORDER — TOBRAMYCIN AND DEXAMETHASONE 3; 1 MG/ML; MG/ML
0.3-0.1 SUSPENSION/ DROPS OPHTHALMIC
Qty: 5 | Refills: 0 | Status: DISCONTINUED | COMMUNITY
Start: 2020-06-22

## 2020-11-30 RX ORDER — METHYLPREDNISOLONE 4 MG/1
4 TABLET ORAL
Qty: 1 | Refills: 0 | Status: DISCONTINUED | COMMUNITY
Start: 2020-07-22 | End: 2020-11-30

## 2020-12-02 NOTE — HISTORY OF PRESENT ILLNESS
[Never] : never [TextBox_4] : S/P hosp for DVT/PE in Sept\par Feeling well\par  no sob, on Eliquis, no cough\par  saw hematologist and was told the labs were normal\par got flu shot\par Feeling relatively well.\par No significant chest discomfort.\par \par only thing is did not go to vascular\par

## 2020-12-02 NOTE — PROCEDURE
[FreeTextEntry1] : EXAM: CT ABDOMEN AND PELVIS OC IC \par \par \par PROCEDURE DATE: 07/25/2020 \par \par \par INTERPRETATION: CLINICAL INFORMATION: Cough, worsening dyspnea on exertion, mild chest pain; found to have acute PE on outside institution CT angiogram. Evaluate for malignancy. \par \par COMPARISON: CT angiogram chest 7/24/2020. \par \par PROCEDURE: \par CT of the Abdomen and Pelvis was performed with intravenous contrast. \par Intravenous contrast: 90 ml Omnipaque 350. 10 ml discarded. \par Oral contrast: positive contrast was administered. \par Sagittal and coronal reformats were performed. \par \par FINDINGS: \par LOWER CHEST: Hypodensity within the lower lobe pulmonary arteries consistent with pulmonary emboli as seen on prior imaging. Trace dependent atelectasis. Few tiny calcified granulomata left posterior costophrenic angle. \par \par LIVER: Numerous subcentimeter bilobar hypodensities are too small to characterize. \par BILE DUCTS: Normal caliber. \par GALLBLADDER: Within normal limits. \par SPLEEN: Within normal limits. \par PANCREAS: Within normal limits. \par ADRENALS: Within normal limits. \par KIDNEYS/URETERS: Within normal limits. \par \par BLADDER: Within normal limits. \par REPRODUCTIVE ORGANS: Prostate within normal limits. \par \par BOWEL: No bowel obstruction. Appendix is not visualized versus shortened appendix.. \par PERITONEUM: No ascites. \par VESSELS: Replaced right hepatic arising from the SMA. \par RETROPERITONEUM/LYMPH NODES: No lymphadenopathy. \par ABDOMINAL WALL: Within normal limits. \par BONES: Degenerative changes. \par \par IMPRESSION: \par Redemonstrated bilateral pulmonary embolisms, better evaluated on prior CT angiogram chest from 7/24/2020. \par \par Numerous subcentimeter bilobar hypodensities within the liver too small to characterize. \par \par No definitive CT evidence of malignancy. \par \par \par FLORENCIO SHANNON M.D., RADIOLOGY RESIDENT \par This document has been electronically signed. \par ANDREWS DIAZ M.D., ATTENDING RADIOLOGIST \par This document has been electronically signed. Jul 26 2020 9:52AM \par \par

## 2020-12-02 NOTE — PHYSICAL EXAM
[No Acute Distress] : no acute distress [Normal Oropharynx] : normal oropharynx [Normal Appearance] : normal appearance [Normal S1, S2] : normal s1, s2 [No Murmurs] : no murmurs [Clear to Auscultation Bilaterally] : clear to auscultation bilaterally [Normal to Percussion] : normal to percussion [Benign] : benign [TextBox_105] : trace edema left

## 2020-12-02 NOTE — DISCUSSION/SUMMARY
[FreeTextEntry1] : DVT.  Recurrent.\par Pulmonary embolic disease with significant clot burden.  Will need follow-up CTA.\par Unprovoked. Reccurent PE\par Had prior pulmonary embolus.  That episode was associated with a trip to [place].\par \par Patient doing well clinically.\par

## 2021-01-26 ENCOUNTER — APPOINTMENT (OUTPATIENT)
Dept: VASCULAR SURGERY | Facility: CLINIC | Age: 56
End: 2021-01-26

## 2021-02-25 ENCOUNTER — TRANSCRIPTION ENCOUNTER (OUTPATIENT)
Age: 56
End: 2021-02-25

## 2021-03-08 ENCOUNTER — APPOINTMENT (OUTPATIENT)
Dept: PULMONOLOGY | Facility: CLINIC | Age: 56
End: 2021-03-08

## 2021-06-29 NOTE — PATIENT PROFILE ADULT. - MENTAL HEALTH CONDITIONS/SYMPTOMS, PROFILE
[de-identified] : PAtient was diagnosed with Sjogrens a year ago and since then has felt like the right nostril is clogged all the time even though it feels very dry. SHe has been using saline and nasocort as well as neilmed sinus rinses. These only provide minor temporary relief. When she lays down she feels like the right nasal cavity gets severely clogged and causes moderate pressure in the right side of the face that radiates tot he side of the head. SHe gets moderate headaches as well. If she lays face down or gets up and walks around she has relief. SHe has tried to avoid antihistamines due to the sjogrens. SHe also has been urinating several times a day and night and has not been sleeping. SHe has tried sudafed which provides benefit but she is afraid to take it due to side effects. She did Afrin when she was very desperate. She had a basal cell in the right side of the exterior nose and had this removed as well and she is wondering if this affects the narrow opening of the nose.  none

## 2021-09-11 ENCOUNTER — TRANSCRIPTION ENCOUNTER (OUTPATIENT)
Age: 56
End: 2021-09-11

## 2021-11-16 ENCOUNTER — APPOINTMENT (OUTPATIENT)
Dept: PULMONOLOGY | Facility: CLINIC | Age: 56
End: 2021-11-16

## 2022-01-26 ENCOUNTER — APPOINTMENT (OUTPATIENT)
Dept: CARDIOLOGY | Facility: CLINIC | Age: 57
End: 2022-01-26
Payer: COMMERCIAL

## 2022-01-26 ENCOUNTER — NON-APPOINTMENT (OUTPATIENT)
Age: 57
End: 2022-01-26

## 2022-01-26 VITALS
SYSTOLIC BLOOD PRESSURE: 119 MMHG | OXYGEN SATURATION: 96 % | HEART RATE: 78 BPM | WEIGHT: 180 LBS | DIASTOLIC BLOOD PRESSURE: 80 MMHG | BODY MASS INDEX: 25.2 KG/M2 | HEIGHT: 71 IN

## 2022-01-26 DIAGNOSIS — R05.9 COUGH, UNSPECIFIED: ICD-10-CM

## 2022-01-26 PROCEDURE — 99204 OFFICE O/P NEW MOD 45 MIN: CPT

## 2022-01-26 PROCEDURE — 93000 ELECTROCARDIOGRAM COMPLETE: CPT

## 2022-01-27 LAB
ALBUMIN SERPL ELPH-MCNC: 4.3 G/DL
ALP BLD-CCNC: 89 U/L
ALT SERPL-CCNC: 12 U/L
ANION GAP SERPL CALC-SCNC: 13 MMOL/L
AST SERPL-CCNC: 18 U/L
BASOPHILS # BLD AUTO: 0.05 K/UL
BASOPHILS NFR BLD AUTO: 0.7 %
BILIRUB SERPL-MCNC: 0.4 MG/DL
BUN SERPL-MCNC: 17 MG/DL
CALCIUM SERPL-MCNC: 9.5 MG/DL
CHLORIDE SERPL-SCNC: 105 MMOL/L
CHOLEST SERPL-MCNC: 191 MG/DL
CO2 SERPL-SCNC: 24 MMOL/L
COVID-19 SPIKE DOMAIN ANTIBODY INTERPRETATION: POSITIVE
CREAT SERPL-MCNC: 0.88 MG/DL
CRP SERPL HS-MCNC: 2.03 MG/L
EOSINOPHIL # BLD AUTO: 0.17 K/UL
EOSINOPHIL NFR BLD AUTO: 2.4 %
ESTIMATED AVERAGE GLUCOSE: 100 MG/DL
GLUCOSE SERPL-MCNC: 104 MG/DL
HBA1C MFR BLD HPLC: 5.1 %
HCT VFR BLD CALC: 45.5 %
HDLC SERPL-MCNC: 37 MG/DL
HGB BLD-MCNC: 14.6 G/DL
IMM GRANULOCYTES NFR BLD AUTO: 0.3 %
LDLC SERPL CALC-MCNC: 105 MG/DL
LYMPHOCYTES # BLD AUTO: 1.67 K/UL
LYMPHOCYTES NFR BLD AUTO: 23.9 %
MAN DIFF?: NORMAL
MCHC RBC-ENTMCNC: 28.2 PG
MCHC RBC-ENTMCNC: 32.1 GM/DL
MCV RBC AUTO: 87.8 FL
MONOCYTES # BLD AUTO: 0.64 K/UL
MONOCYTES NFR BLD AUTO: 9.2 %
NEUTROPHILS # BLD AUTO: 4.44 K/UL
NEUTROPHILS NFR BLD AUTO: 63.5 %
NONHDLC SERPL-MCNC: 154 MG/DL
PLATELET # BLD AUTO: 246 K/UL
POTASSIUM SERPL-SCNC: 4 MMOL/L
PROT SERPL-MCNC: 6.4 G/DL
RBC # BLD: 5.18 M/UL
RBC # FLD: 13.6 %
SARS-COV-2 AB SERPL IA-ACNC: >250 U/ML
SODIUM SERPL-SCNC: 142 MMOL/L
TRIGL SERPL-MCNC: 244 MG/DL
TSH SERPL-ACNC: 1.32 UIU/ML
WBC # FLD AUTO: 6.99 K/UL

## 2022-01-27 NOTE — PHYSICAL EXAM
[Well Developed] : well developed [Well Nourished] : well nourished [Normal Conjunctiva] : normal conjunctiva [Normal S1, S2] : normal S1, S2 [No Murmur] : no murmur [Clear Lung Fields] : clear lung fields [Soft] : abdomen soft [Non Tender] : non-tender [Normal Gait] : normal gait [No Edema] : no edema [No Cyanosis] : no cyanosis [Normal DP B/L] : normal DP B/L [Moves all extremities] : moves all extremities [Alert and Oriented] : alert and oriented [de-identified] : P2 normal [de-identified] : No rales rhonchi or wheezes

## 2022-01-27 NOTE — DISCUSSION/SUMMARY
[FreeTextEntry1] : 1.  Patient had concerns about his Covid antibodies which are quite high spiking antibiotics\par 2.  Blood work otherwise unremarkable\par 3.  Continue Eliquis 5 mg twice daily.  I prescribed the medication\par 4.  In light of his lipid panel I discussed the importance of diet, decrease carbohydrate intake, less meat more chicken without skin fish no fried foods\par \par Would repeat lipid panel probably in 6 months after a good diet\par No further cardiac work-up needed presently\par

## 2022-01-27 NOTE — HISTORY OF PRESENT ILLNESS
[FreeTextEntry1] : Elvin is an extremely healthy 56-year-old without diabetes hypertension non-smoker.  He is quite active and exercises participates in sports without any difficulty.  He is on no medications other than Eliquis\par \par Approximately 4 to 5 years ago he came back from [place] on a 7-hour flight and developed symptoms of shortness of breath.  He was referred emergently to me for urgent cardiac catheterization at [facility]\par \par Cardiac catheterization revealed normal LV function normal coronaries and normal right heart catheterization.\par Subsequent D-dimer that was drawn was elevated and a subsequent CTPA revealed multiple pulmonary emboli\par \par He was treated with IV heparin and subsequent transfer to [facility] which he had been on for at least 6 months..  He had some point had a hypercoagulable work-up which was unremarkable.  He did have a DVT at the time of the pulmonary embolus most likely related to the long flight.\par \par Subsequent to this he had done well until 2019 when he again developed shortness of breath.  He had a DVT and a CTPA compatible with pulmonary embolus.  He was restarted on Eliquis.  Apparently he did have a another hypercoagulable work-up which was unremarkable\par \par Since then he has felt fine without shortness of breath chest pain palpitations.  He remains on Eliquis 5 mg twice a day.\par \par He has received all his COVID-19 vaccines in addition to a booster but he does have concern as to whether his antibodies are appropriate\par \par EKG today normal sinus rhythm RSR prime V1 otherwise within normal limits\par \par \par Blood work from 1/26/2022\par Hemoglobin 14.6 hematocrit 45.5\par Triglycerides 244 cholesterol 191 HDL 37  non-\par Normal renal function normal liver function\par Brennan antibodies greater than 250,000 units/mL\par Hemoglobin A1c 5.1\par C-reactive protein 2.03

## 2022-01-27 NOTE — REASON FOR VISIT
[FreeTextEntry1] : Elvin Joel 56 years old well known to me from prior admissions and cardiac catheterization.

## 2022-01-27 NOTE — ASSESSMENT
[FreeTextEntry1] : Elvin Gonzalez 56 years old is healthy without diabetes hypertension non-smoker no family history of cardiac disease.  He is status post at least 2 pulmonary emboli and a history of DVT.  He has no evidence of a hypercoagulable state.\par \par Presently he remains on Eliquis 5 mg twice daily with no other medications.  He is fully active without any limitation whatsoever.  He has no evidence of recurrence of DVT\par \par Physical exam, EKG are unremarkable\par \par Blood work was drawn today \par Blood work from 1/26/2022\par Hemoglobin 14.6 hematocrit 45.5\par Triglycerides 244 cholesterol 191 HDL 37  non-\par Normal renal function normal liver function\par Brennan antibodies greater than 250,000 units/mL\par Hemoglobin A1c 5.1\par C-reactive protein 2.03\par \par Patient does have some hypertriglyceridemia somewhat low HDL and mildly elevated LDL though he is known normal coronaries from 4 years ago

## 2022-04-28 ENCOUNTER — RX RENEWAL (OUTPATIENT)
Age: 57
End: 2022-04-28

## 2022-05-04 NOTE — DISCHARGE NOTE NURSING/CASE MANAGEMENT/SOCIAL WORK - NSDCPETBCESMAN_GEN_ALL_CORE
Patient describes some urinary frequency and nocturia but no dysuria.   If you are a smoker, it is important for your health to stop smoking. Please be aware that second hand smoke is also harmful.

## 2022-07-11 ENCOUNTER — NON-APPOINTMENT (OUTPATIENT)
Age: 57
End: 2022-07-11

## 2022-10-31 ENCOUNTER — RX RENEWAL (OUTPATIENT)
Age: 57
End: 2022-10-31

## 2022-12-15 ENCOUNTER — APPOINTMENT (OUTPATIENT)
Dept: CARDIOLOGY | Facility: CLINIC | Age: 57
End: 2022-12-15
Payer: COMMERCIAL

## 2022-12-15 DIAGNOSIS — R06.02 SHORTNESS OF BREATH: ICD-10-CM

## 2022-12-15 PROCEDURE — 99213 OFFICE O/P EST LOW 20 MIN: CPT | Mod: 95

## 2022-12-15 NOTE — REASON FOR VISIT
[FreeTextEntry1] : Mahogany Joel date of birth May 5, 1965 had a telemedicine visit with me Dr. Ismael Blair on December 15, 2022\par \par The patient was in his car at the time of the visit and I was in my home [address] [place] [address].\par The patient agreed to the telemedicine visit

## 2022-12-15 NOTE — DISCUSSION/SUMMARY
[FreeTextEntry1] : Patient should continue on Eliquis 5 mg twice a day.  No further cardiac work-up is needed.  We discussed the importance of a prudent low-cholesterol diet\par \par Prescription was sent to his [facility] pharmacy for renewal

## 2022-12-15 NOTE — HISTORY OF PRESENT ILLNESS
[FreeTextEntry1] : To review, Elvin is an extremely healthy 57-year-old without diabetes hypertension non-smoker.  He is quite active and exercises participates in sports without any difficulty.  He is on no medications other than Eliquis\par \par Approximately 6 years ago he came back from [place] on a 7-hour flight and developed symptoms of shortness of breath.  He was referred emergently to me for urgent cardiac catheterization at [facility]\par \par Cardiac catheterization revealed normal LV function normal coronaries and normal right heart catheterization.\par Subsequent D-dimer that was drawn was elevated and a subsequent CTPA revealed multiple pulmonary emboli\par \par He was treated with IV heparin and subsequent transfer to Eliquis which he had been on for at least 6 months..  He had some point had a hypercoagulable work-up which was unremarkable.  He did have a DVT at the time of the pulmonary embolus most likely related to the long flight.\par \par Subsequent to this he had done well until 2019 when he again developed shortness of breath.  He had a DVT and a CTPA compatible with pulmonary embolus.  He was restarted on Eliquis.  Apparently he did have a another hypercoagulable work-up which was unremarkable\par \par Since then he has felt fine without shortness of breath chest pain palpitations.  He remains on Eliquis 5 mg twice a day.\par \par He has received all his COVID-19 vaccines in addition to a booster but he does have concern as to whether his antibodies are appropriate\par \par EKG today normal sinus rhythm RSR prime V1 otherwise within normal limits\par \par \par Blood work from 1/26/2022\par Hemoglobin 14.6 hematocrit 45.5\par Triglycerides 244 cholesterol 191 HDL 37  non-\par Normal renal function normal liver function\par Brennan antibodies greater than 250,000 units/mL\par Hemoglobin A1c 5.1\par C-reactive protein 2.03\par \par Telemedicine visit 12/15/2022: Since the last visit in January 2022, the patient continues to feel well.  He is quite active playing sports 2-3 times a week without shortness of breath chest pain palpitations.  He has had no recurrence of DVT and no recurrence of the shortness of breath that he had recurrent in 2019 when he had another pulmonary embolus.  He apparently has run out of his Eliquis and needs a prescription\par \par His medications include only Eliquis 5 mg twice a day.\par His weight has been quite stable at approximately 178 pounds.  He again is fully active.\par \par He is going to travel to [place] and call for a visit to follow-up and to renew his Eliquis prescription

## 2022-12-15 NOTE — ASSESSMENT
[FreeTextEntry1] : Elvin Gonzalez 57 years old is healthy without diabetes hypertension non-smoker no family history of cardiac disease.  He is status post at least 2 pulmonary emboli and a history of DVT.  He has no evidence of a hypercoagulable state.\par \par Presently he remains on Eliquis 5 mg twice daily with no other medications.  He is fully active without any limitation whatsoever.  He has no evidence of recurrence of DVT\par \par There has been no change in his clinical status.  He is asymptomatic and fully active.\par \par \par

## 2023-05-25 NOTE — DISCHARGE NOTE ADULT - HOSPITAL COURSE
53 yo active M A fib (ablation 9/2016), OA, s/p flight to and from [place] 1 mo ago admitted for L and R HC and found to have above and below the knee LLE DVT and b/l PEs w/ evidence on CTA for RH strain. On 8/8 pt underwent cardiac catheterization to rule out ischemia which revealed clean coronaries. The same day pt underwent CTA of the chest which showed bilateral PEs with evidence of right heart strain. Pt was placed on a heparin gtt and monitored in the CCU 2. During hospitalization pt's chest pressure and dyspnea continued to improve. Repeat TTE on 8/9 showed no right heart strain and only mild right ventricular enlargement. Clinically patient was ambulating well without tachycardia or tachypnea. Complex Repair And Rhombic Flap Text: The defect edges were debeveled with a #15 scalpel blade.  The primary defect was closed partially with a complex linear closure.  Given the location of the remaining defect, shape of the defect and the proximity to free margins a rhombic flap was deemed most appropriate for complete closure of the defect.  Using a sterile surgical marker, an appropriate advancement flap was drawn incorporating the defect and placing the expected incisions within the relaxed skin tension lines where possible. The area thus outlined was incised deep to adipose tissue with a #15 scalpel blade. The skin margins were undermined to an appropriate distance in all directions utilizing iris scissors and carried over to close the primary defect.

## 2023-06-27 ENCOUNTER — APPOINTMENT (OUTPATIENT)
Dept: CARDIOLOGY | Facility: CLINIC | Age: 58
End: 2023-06-27
Payer: COMMERCIAL

## 2023-06-27 ENCOUNTER — NON-APPOINTMENT (OUTPATIENT)
Age: 58
End: 2023-06-27

## 2023-06-27 VITALS
BODY MASS INDEX: 24.78 KG/M2 | OXYGEN SATURATION: 97 % | DIASTOLIC BLOOD PRESSURE: 76 MMHG | HEART RATE: 58 BPM | HEIGHT: 71 IN | SYSTOLIC BLOOD PRESSURE: 123 MMHG | WEIGHT: 177 LBS

## 2023-06-27 DIAGNOSIS — I26.99 OTHER PULMONARY EMBOLISM W/OUT ACUTE COR PULMONALE: ICD-10-CM

## 2023-06-27 DIAGNOSIS — I82.409 ACUTE EMBOLISM AND THROMBOSIS OF UNSPECIFIED DEEP VEINS OF UNSPECIFIED LOWER EXTREMITY: ICD-10-CM

## 2023-06-27 PROCEDURE — 93000 ELECTROCARDIOGRAM COMPLETE: CPT

## 2023-06-27 PROCEDURE — 99214 OFFICE O/P EST MOD 30 MIN: CPT

## 2023-06-27 RX ORDER — ALBUTEROL SULFATE 90 UG/1
108 (90 BASE) INHALANT RESPIRATORY (INHALATION)
Refills: 0 | Status: DISCONTINUED | COMMUNITY
End: 2023-06-27

## 2023-06-27 NOTE — DISCUSSION/SUMMARY
[EKG obtained to assist in diagnosis and management of assessed problem(s)] : EKG obtained to assist in diagnosis and management of assessed problem(s) [FreeTextEntry1] : Patient must continue on Eliquis 5 mg twice a day.  No further cardiac work-up is needed.   \par There is no cardiovascular contraindication to his upcoming flight. \par Would recommend extra leg room accommodations for this patient with known history of PE/DVT.\par \par He will follow up per the usual schedule.

## 2023-06-27 NOTE — END OF VISIT
[FreeTextEntry3] : I saw the patient with Liyah Bowers NP and I agree with the clinical findings, exam and assessment and plan as above.\par

## 2023-06-27 NOTE — ASSESSMENT
[FreeTextEntry1] : Elvin Joel is a 57 years old is healthy without diabetes hypertension non-smoker no family history of cardiac disease.  He is status post at least 2 pulmonary emboli and a history of DVT.  He has no evidence of a hypercoagulable state.\par \par Presently he remains on Eliquis 5 mg twice daily with no other medications.  He is fully active without any limitation whatsoever.  He has no evidence of recurrence of DVT\par \par There has been no change in his clinical status.  He is asymptomatic and fully active.\par \par \par

## 2023-06-27 NOTE — REASON FOR VISIT
[FreeTextEntry1] : 6/27/2023\par Elvin Joel returns today for routine scheduled follow up.\par He is planning to fly to [facility] on Thursday to visit with his daughter and is concerned about PE/DVT given his history. Today he reports feeling very well in general and has no specific cardiovascular complaints. 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 He is not limited by any chest discomfort, shortness of breath, palpitations, lightheadedness or syncope. He continues to take Eliquis 5 mg twice daily as directed and denies any issues with bruising or bleeding. \par PRN Medrol (dosed by another physician) for joint pain.

## 2023-06-27 NOTE — HISTORY OF PRESENT ILLNESS
[FreeTextEntry1] : To review, Elvin is an extremely healthy 57-year-old without diabetes hypertension non-smoker.  He is quite active and exercises participates in sports without any difficulty.  He is on no medications other than Eliquis\par \par Approximately 6 years ago he came back from [place] on a 7-hour flight and developed symptoms of shortness of breath.  He was referred emergently to me for urgent cardiac catheterization at [facility]\par \par Cardiac catheterization revealed normal LV function normal coronaries and normal right heart catheterization.\par Subsequent D-dimer that was drawn was elevated and a subsequent CTPA revealed multiple pulmonary emboli\par \par He was treated with IV heparin and subsequent transfer to Eliquis which he had been on for at least 6 months..  He had some point had a hypercoagulable work-up which was unremarkable.  He did have a DVT at the time of the pulmonary embolus most likely related to the long flight.\par \par Subsequent to this he had done well until 2019 when he again developed shortness of breath.  He had a DVT and a CTPA compatible with pulmonary embolus.  He was restarted on Eliquis.  Apparently he did have a another hypercoagulable work-up which was unremarkable\par \par Since then he has felt fine without shortness of breath chest pain palpitations.  He remains on Eliquis 5 mg twice a day.\par \par He has received all his COVID-19 vaccines in addition to a booster but he does have concern as to whether his antibodies are appropriate\par \par EKG today normal sinus rhythm RSR prime V1 otherwise within normal limits\par \par \par Blood work from 1/26/2022\par Hemoglobin 14.6 hematocrit 45.5\par Triglycerides 244 cholesterol 191 HDL 37  non-\par Normal renal function normal liver function\par Brennan antibodies greater than 250,000 units/mL\par Hemoglobin A1c 5.1\par C-reactive protein 2.03\par \par Telemedicine visit 12/15/2022: Since the last visit in January 2022, the patient continues to feel well.  He is quite active playing sports 2-3 times a week without shortness of breath chest pain palpitations.  He has had no recurrence of DVT and no recurrence of the shortness of breath that he had recurrent in 2019 when he had another pulmonary embolus.  He apparently has run out of his Eliquis and needs a prescription\par \par His medications include only Eliquis 5 mg twice a day.\par His weight has been quite stable at approximately 178 pounds.  He again is fully active.\par \par He is going to travel to [place] and call for a visit to follow-up and to renew his Eliquis prescription

## 2023-11-10 NOTE — PATIENT PROFILE ADULT - HAVE YOU EXPERIENCED VIOLENCE OR A TRAUMATIC EVENT?
"Subjective   Patient ID: Mariann Mock is a 66 y.o. female who presents for Follow-up (1 month follow up ).    She is doing well, feels fine.She denies fever , nausea , sputum , dyspnea , dizziness , palpitations.She is taking care of many grandchildren. She has familial stress .She does not want epidural inj. in her back No dysuria , bleeding. She has some constipation , otc meds help.  She has diffuse musculoskeletal pains. She  had previous knee surgery by Dr Simeon.She takes gabapentin daily for numbness and paresthesia. She has mild urinary incontinence.  She takes tramadol prn for  moderate pain  She had CT chest , which shows evidence of emphysema, she has long h/o smoking.  She is actively trying to quit smoking, down to 1 cig / week  and will see Pulmonary at [facility] .   She has anemia, and advised to take Iron po daily and seen by NP of  Dr Leary for colonoscopy  on 7/06/23 , She is scheduled for EGD and Colonoscopy , seperately.           Review of Systems   Constitutional:  Negative for activity change, fatigue and fever.   HENT:  Negative for congestion.    Eyes:  Negative for redness and visual disturbance.   Respiratory:  Negative for cough, shortness of breath and wheezing.    Cardiovascular:  Negative for chest pain, palpitations and leg swelling.   Gastrointestinal:  Negative for abdominal pain, blood in stool and constipation.   Endocrine: Negative.    Genitourinary:  Negative for dysuria, frequency and urgency.   Musculoskeletal:  Positive for arthralgias and back pain. Negative for joint swelling.   Skin:  Negative for rash.   Allergic/Immunologic: Negative.    Neurological:  Negative for dizziness, weakness, numbness and headaches.   Hematological:  Negative for adenopathy.   Psychiatric/Behavioral:  Negative for agitation and behavioral problems.        Objective   Ht 1.651 m (5' 5\")   BMI 15.94 kg/m²     Physical Exam  Constitutional:       Appearance: Normal appearance.   HENT:      Head: " Normocephalic and atraumatic.      Right Ear: Tympanic membrane and external ear normal.      Left Ear: Tympanic membrane and external ear normal.      Nose: No congestion.      Mouth/Throat:      Mouth: Mucous membranes are moist.      Pharynx: Oropharynx is clear.   Eyes:      Extraocular Movements: Extraocular movements intact.      Conjunctiva/sclera: Conjunctivae normal.      Pupils: Pupils are equal, round, and reactive to light.   Cardiovascular:      Rate and Rhythm: Normal rate and regular rhythm.      Pulses: Normal pulses.      Heart sounds: Normal heart sounds. No murmur heard.  Pulmonary:      Effort: Pulmonary effort is normal.      Breath sounds: Normal breath sounds. No wheezing or rales.   Abdominal:      General: Abdomen is flat. Bowel sounds are normal.      Palpations: Abdomen is soft.      Hernia: No hernia is present.   Musculoskeletal:         General: No swelling or tenderness. Normal range of motion.      Cervical back: Normal range of motion and neck supple.      Right lower leg: No edema.      Left lower leg: No edema.   Skin:     General: Skin is warm and dry.      Capillary Refill: Capillary refill takes less than 2 seconds.      Findings: No rash.   Neurological:      General: No focal deficit present.      Mental Status: She is alert and oriented to person, place, and time.   Psychiatric:         Mood and Affect: Mood normal.         Behavior: Behavior normal.         Assessment/Plan        Lumbar Degenerative Disk Disease:  pain control , tramadol prn , refilled , # 60   OARRS reviewed  otc analgesics , Tizanidine prn  s/p  Lumbar spine surgery on 4/19/22      Fibromyalgia:  otc analgesics  increase activity,exercises  rec. PT and water therapy , she goes to [facility]    Knee joint OA:  S/p bilateral TKA  Orthopedic followup  with   Dr Simeon at [facility]   pain control , regular activity    no current issues , pain or mobility         Polyneuropathy:  Reduce gabapentin to 600mg po  tid    COPD:   Proair inhaler  PFT's  advised  CT Chest reviewed   Pulmonology consult, Dr Martir Valencia at [facility]  stop smoking     Nicotine dependence:     :  smoking cessation , counselled again  FH : Mom  of lung CA     Iron Deficiency Anemia:    Iron po   EGD and Colonoscopy per Dr Leary                 no

## 2024-03-04 RX ORDER — APIXABAN 5 MG/1
5 TABLET, FILM COATED ORAL
Qty: 180 | Refills: 3 | Status: ACTIVE | COMMUNITY
Start: 2020-07-31 | End: 1900-01-01

## 2024-03-13 ENCOUNTER — APPOINTMENT (OUTPATIENT)
Dept: CARDIOLOGY | Facility: CLINIC | Age: 59
End: 2024-03-13

## 2024-05-27 ENCOUNTER — NON-APPOINTMENT (OUTPATIENT)
Age: 59
End: 2024-05-27

## 2024-10-23 ENCOUNTER — APPOINTMENT (OUTPATIENT)
Dept: CARDIOLOGY | Facility: CLINIC | Age: 59
End: 2024-10-23
Payer: COMMERCIAL

## 2024-10-23 ENCOUNTER — NON-APPOINTMENT (OUTPATIENT)
Age: 59
End: 2024-10-23

## 2024-10-23 VITALS
OXYGEN SATURATION: 95 % | HEIGHT: 71 IN | SYSTOLIC BLOOD PRESSURE: 111 MMHG | BODY MASS INDEX: 24.78 KG/M2 | DIASTOLIC BLOOD PRESSURE: 76 MMHG | WEIGHT: 177 LBS | HEART RATE: 86 BPM

## 2024-10-23 DIAGNOSIS — R06.02 SHORTNESS OF BREATH: ICD-10-CM

## 2024-10-23 DIAGNOSIS — I82.409 ACUTE EMBOLISM AND THROMBOSIS OF UNSPECIFIED DEEP VEINS OF UNSPECIFIED LOWER EXTREMITY: ICD-10-CM

## 2024-10-23 DIAGNOSIS — T63.441A TOXIC EFFECT OF VENOM OF BEES, ACCIDENTAL (UNINTENTIONAL), INITIAL ENCOUNTER: ICD-10-CM

## 2024-10-23 DIAGNOSIS — I26.99 OTHER PULMONARY EMBOLISM W/OUT ACUTE COR PULMONALE: ICD-10-CM

## 2024-10-23 DIAGNOSIS — M79.89 OTHER SPECIFIED SOFT TISSUE DISORDERS: ICD-10-CM

## 2024-10-23 PROCEDURE — G2211 COMPLEX E/M VISIT ADD ON: CPT | Mod: NC

## 2024-10-23 PROCEDURE — 99204 OFFICE O/P NEW MOD 45 MIN: CPT

## 2024-10-23 PROCEDURE — 93000 ELECTROCARDIOGRAM COMPLETE: CPT

## 2024-10-27 PROBLEM — E78.2 MIXED HYPERLIPIDEMIA: Status: ACTIVE | Noted: 2024-10-27

## 2024-10-27 LAB
25(OH)D3 SERPL-MCNC: 22 NG/ML
ALBUMIN SERPL ELPH-MCNC: 4.1 G/DL
ALP BLD-CCNC: 88 U/L
ALT SERPL-CCNC: 11 U/L
ANION GAP SERPL CALC-SCNC: 16 MMOL/L
APPEARANCE: CLEAR
AST SERPL-CCNC: 17 U/L
BACTERIA: NEGATIVE /HPF
BILIRUB SERPL-MCNC: 0.3 MG/DL
BILIRUBIN URINE: NEGATIVE
BLOOD URINE: ABNORMAL
BUN SERPL-MCNC: 15 MG/DL
CALCIUM SERPL-MCNC: 9.3 MG/DL
CAST: 0 /LPF
CHLORIDE SERPL-SCNC: 102 MMOL/L
CHOLEST SERPL-MCNC: 225 MG/DL
CO2 SERPL-SCNC: 21 MMOL/L
COLOR: YELLOW
CREAT SERPL-MCNC: 0.84 MG/DL
EGFR: 102 ML/MIN/1.73M2
EPITHELIAL CELLS: 1 /HPF
ESTIMATED AVERAGE GLUCOSE: 103 MG/DL
GLUCOSE QUALITATIVE U: NEGATIVE MG/DL
GLUCOSE SERPL-MCNC: 89 MG/DL
HBA1C MFR BLD HPLC: 5.2 %
HCT VFR BLD CALC: 45.7 %
HDLC SERPL-MCNC: 42 MG/DL
HGB BLD-MCNC: 14.5 G/DL
KETONES URINE: NEGATIVE MG/DL
LDLC SERPL CALC-MCNC: 141 MG/DL
LEUKOCYTE ESTERASE URINE: ABNORMAL
MCHC RBC-ENTMCNC: 28.5 PG
MCHC RBC-ENTMCNC: 31.7 GM/DL
MCV RBC AUTO: 89.8 FL
MICROSCOPIC-UA: NORMAL
NITRITE URINE: NEGATIVE
NONHDLC SERPL-MCNC: 183 MG/DL
NT-PROBNP SERPL-MCNC: 57 PG/ML
PH URINE: 5.5
PLATELET # BLD AUTO: 234 K/UL
POTASSIUM SERPL-SCNC: 4.4 MMOL/L
PROT SERPL-MCNC: 6.2 G/DL
PROTEIN URINE: NORMAL MG/DL
RBC # BLD: 5.09 M/UL
RBC # FLD: 14 %
RED BLOOD CELLS URINE: 0 /HPF
SODIUM SERPL-SCNC: 139 MMOL/L
SPECIFIC GRAVITY URINE: 1.02
TRIGL SERPL-MCNC: 230 MG/DL
TSH SERPL-ACNC: 1.87 UIU/ML
UROBILINOGEN URINE: 0.2 MG/DL
WBC # FLD AUTO: 8.38 K/UL
WHITE BLOOD CELLS URINE: 14 /HPF

## 2024-12-18 ENCOUNTER — OUTPATIENT (OUTPATIENT)
Dept: OUTPATIENT SERVICES | Facility: HOSPITAL | Age: 59
LOS: 1 days | End: 2024-12-18
Payer: COMMERCIAL

## 2024-12-18 ENCOUNTER — APPOINTMENT (OUTPATIENT)
Dept: CT IMAGING | Facility: IMAGING CENTER | Age: 59
End: 2024-12-18
Payer: COMMERCIAL

## 2024-12-18 DIAGNOSIS — E78.2 MIXED HYPERLIPIDEMIA: ICD-10-CM

## 2024-12-18 DIAGNOSIS — Z98.890 OTHER SPECIFIED POSTPROCEDURAL STATES: Chronic | ICD-10-CM

## 2024-12-18 DIAGNOSIS — Z90.49 ACQUIRED ABSENCE OF OTHER SPECIFIED PARTS OF DIGESTIVE TRACT: Chronic | ICD-10-CM

## 2024-12-18 DIAGNOSIS — R06.02 SHORTNESS OF BREATH: ICD-10-CM

## 2024-12-18 DIAGNOSIS — Z00.8 ENCOUNTER FOR OTHER GENERAL EXAMINATION: ICD-10-CM

## 2024-12-18 PROCEDURE — 75571 CT HRT W/O DYE W/CA TEST: CPT | Mod: 26

## 2024-12-18 PROCEDURE — 75571 CT HRT W/O DYE W/CA TEST: CPT

## 2025-03-14 ENCOUNTER — APPOINTMENT (OUTPATIENT)
Dept: VASCULAR SURGERY | Facility: CLINIC | Age: 60
End: 2025-03-14
Payer: COMMERCIAL

## 2025-03-14 VITALS
WEIGHT: 172 LBS | BODY MASS INDEX: 24.08 KG/M2 | HEART RATE: 76 BPM | HEIGHT: 71 IN | DIASTOLIC BLOOD PRESSURE: 83 MMHG | TEMPERATURE: 98.7 F | SYSTOLIC BLOOD PRESSURE: 113 MMHG

## 2025-03-14 VITALS — HEART RATE: 77 BPM | DIASTOLIC BLOOD PRESSURE: 73 MMHG | SYSTOLIC BLOOD PRESSURE: 106 MMHG

## 2025-03-14 PROCEDURE — 99204 OFFICE O/P NEW MOD 45 MIN: CPT

## 2025-03-14 PROCEDURE — 93970 EXTREMITY STUDY: CPT
